# Patient Record
Sex: MALE | Race: WHITE | NOT HISPANIC OR LATINO | Employment: FULL TIME | URBAN - NONMETROPOLITAN AREA
[De-identification: names, ages, dates, MRNs, and addresses within clinical notes are randomized per-mention and may not be internally consistent; named-entity substitution may affect disease eponyms.]

---

## 2021-09-27 ENCOUNTER — LAB (OUTPATIENT)
Dept: LAB | Facility: HOSPITAL | Age: 66
End: 2021-09-27

## 2021-09-27 ENCOUNTER — OFFICE VISIT (OUTPATIENT)
Dept: GASTROENTEROLOGY | Facility: CLINIC | Age: 66
End: 2021-09-27

## 2021-09-27 VITALS
DIASTOLIC BLOOD PRESSURE: 70 MMHG | WEIGHT: 228 LBS | TEMPERATURE: 97.8 F | HEART RATE: 66 BPM | BODY MASS INDEX: 31.92 KG/M2 | HEIGHT: 71 IN | SYSTOLIC BLOOD PRESSURE: 122 MMHG | OXYGEN SATURATION: 96 %

## 2021-09-27 DIAGNOSIS — K76.0 FATTY LIVER: ICD-10-CM

## 2021-09-27 DIAGNOSIS — R74.8 ELEVATED LIVER ENZYMES: ICD-10-CM

## 2021-09-27 DIAGNOSIS — R74.8 ELEVATED LIVER ENZYMES: Primary | ICD-10-CM

## 2021-09-27 LAB
ALBUMIN SERPL-MCNC: 4.4 G/DL (ref 3.5–5.2)
ALBUMIN/GLOB SERPL: 1.9 G/DL
ALP SERPL-CCNC: 36 U/L (ref 39–117)
ALT SERPL W P-5'-P-CCNC: 188 U/L (ref 1–41)
ANION GAP SERPL CALCULATED.3IONS-SCNC: 9.7 MMOL/L (ref 5–15)
AST SERPL-CCNC: 135 U/L (ref 1–40)
BILIRUB SERPL-MCNC: 0.6 MG/DL (ref 0–1.2)
BUN SERPL-MCNC: 17 MG/DL (ref 8–23)
BUN/CREAT SERPL: 16.5 (ref 7–25)
CALCIUM SPEC-SCNC: 9.6 MG/DL (ref 8.6–10.5)
CERULOPLASMIN SERPL-MCNC: 18 MG/DL (ref 16–31)
CHLORIDE SERPL-SCNC: 103 MMOL/L (ref 98–107)
CO2 SERPL-SCNC: 26.3 MMOL/L (ref 22–29)
CREAT SERPL-MCNC: 1.03 MG/DL (ref 0.76–1.27)
FERRITIN SERPL-MCNC: 330 NG/ML (ref 30–400)
GFR SERPL CREATININE-BSD FRML MDRD: 73 ML/MIN/1.73
GLOBULIN UR ELPH-MCNC: 2.3 GM/DL
GLUCOSE SERPL-MCNC: 162 MG/DL (ref 65–99)
HAV IGM SERPL QL IA: NORMAL
IRON 24H UR-MRATE: 121 MCG/DL (ref 59–158)
IRON SATN MFR SERPL: 25 % (ref 20–50)
POTASSIUM SERPL-SCNC: 4.4 MMOL/L (ref 3.5–5.2)
PROT SERPL-MCNC: 6.7 G/DL (ref 6–8.5)
SODIUM SERPL-SCNC: 139 MMOL/L (ref 136–145)
TIBC SERPL-MCNC: 478 MCG/DL (ref 298–536)
TRANSFERRIN SERPL-MCNC: 321 MG/DL (ref 200–360)

## 2021-09-27 PROCEDURE — 84466 ASSAY OF TRANSFERRIN: CPT

## 2021-09-27 PROCEDURE — 86709 HEPATITIS A IGM ANTIBODY: CPT

## 2021-09-27 PROCEDURE — 82104 ALPHA-1-ANTITRYPSIN PHENO: CPT

## 2021-09-27 PROCEDURE — 86038 ANTINUCLEAR ANTIBODIES: CPT

## 2021-09-27 PROCEDURE — 83540 ASSAY OF IRON: CPT

## 2021-09-27 PROCEDURE — 99204 OFFICE O/P NEW MOD 45 MIN: CPT | Performed by: NURSE PRACTITIONER

## 2021-09-27 PROCEDURE — 86376 MICROSOMAL ANTIBODY EACH: CPT

## 2021-09-27 PROCEDURE — 82390 ASSAY OF CERULOPLASMIN: CPT

## 2021-09-27 PROCEDURE — 82103 ALPHA-1-ANTITRYPSIN TOTAL: CPT

## 2021-09-27 PROCEDURE — 36415 COLL VENOUS BLD VENIPUNCTURE: CPT

## 2021-09-27 PROCEDURE — 83516 IMMUNOASSAY NONANTIBODY: CPT

## 2021-09-27 PROCEDURE — 80053 COMPREHEN METABOLIC PANEL: CPT

## 2021-09-27 PROCEDURE — 82728 ASSAY OF FERRITIN: CPT

## 2021-09-27 RX ORDER — MONTELUKAST SODIUM 10 MG/1
10 TABLET ORAL
COMMUNITY
Start: 2021-09-21

## 2021-09-27 RX ORDER — TRIAMCINOLONE ACETONIDE 40 MG/ML
40 INJECTION, SUSPENSION INTRA-ARTICULAR; INTRAMUSCULAR
COMMUNITY

## 2021-09-27 RX ORDER — TADALAFIL 20 MG/1
TABLET ORAL TAKE AS DIRECTED
COMMUNITY

## 2021-09-27 RX ORDER — EZETIMIBE 10 MG/1
10 TABLET ORAL
COMMUNITY
Start: 2021-09-21

## 2021-09-27 RX ORDER — FENOFIBRATE 145 MG/1
145 TABLET, COATED ORAL EVERY EVENING
COMMUNITY
Start: 2021-09-21

## 2021-09-27 RX ORDER — GABAPENTIN 100 MG/1
100 CAPSULE ORAL
COMMUNITY

## 2021-09-27 RX ORDER — ESCITALOPRAM OXALATE 20 MG/1
20 TABLET ORAL DAILY
COMMUNITY

## 2021-09-27 RX ORDER — TAMSULOSIN HYDROCHLORIDE 0.4 MG/1
1 CAPSULE ORAL DAILY
COMMUNITY

## 2021-09-27 RX ORDER — ROPINIROLE 0.5 MG/1
0.5 TABLET, FILM COATED ORAL DAILY
COMMUNITY
Start: 2021-08-19

## 2021-09-27 RX ORDER — LOSARTAN POTASSIUM 50 MG/1
1 TABLET ORAL DAILY
COMMUNITY

## 2021-09-27 RX ORDER — ASPIRIN 81 MG/1
1 TABLET ORAL DAILY
COMMUNITY

## 2021-09-27 NOTE — PROGRESS NOTES
"Chief Complaint:   Chief Complaint   Patient presents with   • Elevated Hepatic Enzymes     Pt had labs for rheumatologist that showed elevated liver enzymes-presents today to discuss         Patient ID: John Pereira is a 66 y.o. male     History of Present Illness: This is a very pleasant 66-year-old male who is here today referred for elevated liver enzymes.    Labs on 9/9/2021 CMP revealed hyperglycemia, creatinine 1.27, GFR 59, ,  previously they had been  .  Hepatitis B Ag negative, hepatitis B core antibody negative, hepatitis C antibody negative smooth muscle antibody 2.6 M2 antibody less than 20.  Liver ultrasound was performed on 8/31/2021 with findings of echogenic liver most commonly due to fatty liver.  Cholecystectomy no bile duct dilation.  The patient states that within the past year to 6 months has been the only time his liver enzymes have been elevated.  After discussion he does tell me that both gabapentin and Requip were initiated in the past year.  He t fatty liver ells me that he has taken Mobic \"for years but this was stopped about 5 months ago due to elevated liver enzymes.\"  The patient denies any history of tattoos, no IV drug use, no blood transfusions.  The patient states that he drinks total of 10 drinks a week \"sometimes beer or sometimes wine.\"    The patient denies any nausea, vomiting, epigastric pain, dysphagia, pyrosis or hematemesis.  The patient denies any fever or chills.  Denies any melena or hematochezia.  Denies any unintentional weight loss or loss of appetite.  The patient denies any jaundice, icterus, edema or ascites.        Past Medical History:   Diagnosis Date   • Arthritis    • History of squamous cell carcinoma    • Hyperlipidemia    • Hypertension    • Restless legs syndrome (RLS)        Past Surgical History:   Procedure Laterality Date   • ACHILLES TENDON REPAIR     • MANDIBLE SURGERY     • SQUAMOUS CELL CARCINOMA EXCISION      " From face         Current Outpatient Medications:   •  aspirin (ASPIR) 81 MG EC tablet, Take 1 tablet by mouth Daily., Disp: , Rfl:   •  escitalopram (LEXAPRO) 20 MG tablet, Take 20 mg by mouth Daily., Disp: , Rfl:   •  ezetimibe (ZETIA) 10 MG tablet, Take 10 mg by mouth every night at bedtime., Disp: , Rfl:   •  fenofibrate (TRICOR) 145 MG tablet, Take 145 mg by mouth Every Evening., Disp: , Rfl:   •  gabapentin (NEURONTIN) 100 MG capsule, Take 100 mg by mouth every night at bedtime., Disp: , Rfl:   •  losartan (COZAAR) 50 MG tablet, Take 1 tablet by mouth Daily., Disp: , Rfl:   •  montelukast (SINGULAIR) 10 MG tablet, Take 10 mg by mouth every night at bedtime., Disp: , Rfl:   •  rOPINIRole (REQUIP) 0.5 MG tablet, Take 0.5 mg by mouth Daily., Disp: , Rfl:   •  tadalafil (CIALIS) 20 MG tablet, Take As Directed., Disp: , Rfl:   •  tamsulosin (FLOMAX) 0.4 MG capsule 24 hr capsule, Take 1 capsule by mouth Daily., Disp: , Rfl:   •  triamcinolone acetonide (Kenalog-40) 40 MG/ML injection, Inject 40 mg into the appropriate muscle as directed by prescriber Every 3 (Three) Months., Disp: , Rfl:     Allergies   Allergen Reactions   • Ciprofloxacin Other (See Comments)     Caused achilles tendonitis       Social History     Socioeconomic History   • Marital status:      Spouse name: Not on file   • Number of children: Not on file   • Years of education: Not on file   • Highest education level: Not on file   Tobacco Use   • Smoking status: Never Smoker   • Smokeless tobacco: Never Used   Vaping Use   • Vaping Use: Never used   Substance and Sexual Activity   • Alcohol use: Yes     Comment: 9/27/21-Daily-depends on the day how much or if he has a drink every day   • Drug use: Defer   • Sexual activity: Defer       Family History   Problem Relation Age of Onset   • Colon polyps Neg Hx    • Colon cancer Neg Hx    • Esophageal cancer Neg Hx    • Liver cancer Neg Hx    • Stomach cancer Neg Hx    • Rectal cancer Neg Hx    •  "Liver disease Neg Hx        Vitals:    09/27/21 0932   BP: 122/70   BP Location: Left arm   Patient Position: Sitting   Cuff Size: Adult   Pulse: 66   Temp: 97.8 °F (36.6 °C)   TempSrc: Infrared   SpO2: 96%   Weight: 103 kg (228 lb)   Height: 179.1 cm (70.5\")       Review of Systems:    General:    Present -feeling well   Skin:    Not Present-Rash   HEENT:     Not Present-Acute visual changes or Acute hearing changes   Neck :    Not Present- swollen glands   Genitourinary:      Not Present- burning, frequency, urgency hematuria, dysuria,   Cardiovascular:   Not Present-chest pain, palpitations, or pressure   Respiratory:   Not Present- shortness of breath or cough   Gastrointestinal:  Musculoskeletal:  Neurological:  Psychiatric:   Present as mentioned in the HP    Not Present. Recent gait disturbances.    Not Present-Seizures and weakness in extremities.    Not Present- Anxiety or Depression.       Physical Exam:    General Appearance:    Alert, cooperative, in no acute distress   Psych:    Mood appropriate    Eyes:          conjunctivae and sclerae normal, no   icterus, no pallor   ENMT:    Ears appear intact with no abnormalities noted oral mucosa moist   Neck:   No adenopathy, supple, trachea midline, no thyromegaly, no   carotid bruit, no JVD    Cardiovascular:    Regular rhythm and normal rate, normal S1 and S2, no            murmur, no gallop, no rub, no click   Gastrointestinal:     Inspection normal.  Normal bowel sounds, no masses, no organomegaly, soft round non-tender, non-distended, no guarding, no rebound or tenderness. No hepatosplenomegaly.   Skin:   No bleeding, bruising or rash   Neurologic:   nonfocal       Assessment and Plan:  Assessment/Plan   Diagnoses and all orders for this visit:    1. Elevated liver enzymes (Primary)  -     KATHARINA; Future  -     Mitochondrial Antibodies, M2; Future  -     Anti-Smooth Muscle Antibody Titer; Future  -     Anti-microsomal Antibody; Future  -     Iron Profile; " Future  -     Ferritin; Future  -     Ceruloplasmin; Future  -     Alpha - 1 - Antitrypsin; Future  -     Alpha - 1 - Antitrypsin Phenotype; Future  -     Hepatitis A Antibody, IgM; Future  -     Comprehensive Metabolic Panel; Future    2. Fatty liver  -     KATHARINA; Future  -     Mitochondrial Antibodies, M2; Future  -     Anti-Smooth Muscle Antibody Titer; Future  -     Anti-microsomal Antibody; Future  -     Iron Profile; Future  -     Ferritin; Future  -     Ceruloplasmin; Future  -     Alpha - 1 - Antitrypsin; Future  -     Alpha - 1 - Antitrypsin Phenotype; Future  -     Hepatitis A Antibody, IgM; Future  -     Comprehensive Metabolic Panel; Future    Other orders  -     Cancel: US Liver; Future      Labs as noted above when all are resulted will notify patient on how we need to further proceed.  We will need to discuss colonoscopy with patient as I do not see he has had one in the past.     There are no Patient Instructions on file for this visit.    Next follow-up appointment        EMR Dragon/Transcription disclaimer:  Much of this encounter note is an electronic transcription/translation of spoken language to printed text. The electronic translation of spoken language may permit erroneous, or at times, nonsensical words or phrases to be inadvertently transcribed; although I have reviewed the note for such errors, some may still exist.

## 2021-09-28 LAB
ACTIN IGG SERPL-ACNC: 18 UNITS (ref 0–19)
ANA SER QL: NEGATIVE
LKM-1 AB SER-ACNC: 1.1 UNITS (ref 0–20)
MITOCHONDRIA M2 IGG SER-ACNC: <20 UNITS (ref 0–20)

## 2021-10-01 LAB
A1AT PHENOTYP SERPL IFE: NORMAL
A1AT SERPL-MCNC: 122 MG/DL (ref 101–187)

## 2021-10-20 ENCOUNTER — TELEPHONE (OUTPATIENT)
Dept: GASTROENTEROLOGY | Facility: CLINIC | Age: 66
End: 2021-10-20

## 2021-10-20 DIAGNOSIS — R74.8 ELEVATED LIVER ENZYMES: Primary | ICD-10-CM

## 2021-10-20 NOTE — TELEPHONE ENCOUNTER
----- Message from DUSTIN Sheehan sent at 10/20/2021  1:58 PM CDT -----  Please notify patient labs were essentially unremarkable with the exception of elevated liver enzymes.  His liver ultrasound on office visit was consistent with fatty liver.  As I discussed with him it is very important that he work on weight loss and absolutely stop alcohol as his liver enzymes continue to be very elevated.  I have placed orders for repeat liver enzymes on 4/1/2022.  Please make him a 6-month follow-up right after the labs to see Dr. Brito.

## 2021-10-21 NOTE — TELEPHONE ENCOUNTER
Pt returned my call and I spoke to him re: results-he VU. He will make himself a note to have labs in April and was advised to call me back with any further questions/problems.     Dayana-he needs an OV with Dr. Brito in April for fatty liver f/u. I told him you would call him tomorrow to schedule that. He is a  so the best time to reach him is before 2:30pm. Thanks!

## 2021-10-21 NOTE — TELEPHONE ENCOUNTER
Pt returned my call late yesterday afternoon and left me a VM. I tried to call him back today-was unable to reach him so I left another VM asking him to call me back to discuss.

## 2021-10-22 NOTE — TELEPHONE ENCOUNTER
Pt is scheduled for OV with Dr. Brito 5/25/21-due to pt's work scheduled(he is a ) he had to wait until school is out. He would prefer to have his labs done in [place] so I am going to mail him lab orders to have done 1 week prior to his OV. Pt advised to call me back with any further questions/problems.

## 2022-05-15 PROBLEM — R79.89 ABNORMAL LIVER FUNCTION TESTS: Status: ACTIVE | Noted: 2022-05-15

## 2023-10-20 ENCOUNTER — OFFICE VISIT (OUTPATIENT)
Dept: CARDIOLOGY | Facility: CLINIC | Age: 68
End: 2023-10-20
Payer: MEDICARE

## 2023-10-20 DIAGNOSIS — R06.09 DOE (DYSPNEA ON EXERTION): ICD-10-CM

## 2023-10-20 DIAGNOSIS — E78.2 MIXED HYPERLIPIDEMIA: ICD-10-CM

## 2023-10-20 DIAGNOSIS — R29.818 SUSPECTED SLEEP APNEA: ICD-10-CM

## 2023-10-20 DIAGNOSIS — N52.8 OTHER MALE ERECTILE DYSFUNCTION: ICD-10-CM

## 2023-10-20 DIAGNOSIS — I10 PRIMARY HYPERTENSION: ICD-10-CM

## 2023-10-20 DIAGNOSIS — I35.1 NONRHEUMATIC AORTIC VALVE INSUFFICIENCY: Primary | ICD-10-CM

## 2023-10-20 DIAGNOSIS — Z01.810 PREOP CARDIOVASCULAR EXAM: ICD-10-CM

## 2023-10-20 DIAGNOSIS — G89.29 CHRONIC PAIN OF RIGHT KNEE: ICD-10-CM

## 2023-10-20 DIAGNOSIS — M25.561 CHRONIC PAIN OF RIGHT KNEE: ICD-10-CM

## 2023-10-20 DIAGNOSIS — R79.89 ABNORMAL LIVER FUNCTION TESTS: ICD-10-CM

## 2023-10-20 DIAGNOSIS — R35.1 BPH ASSOCIATED WITH NOCTURIA: ICD-10-CM

## 2023-10-20 DIAGNOSIS — N40.1 BPH ASSOCIATED WITH NOCTURIA: ICD-10-CM

## 2023-10-20 DIAGNOSIS — R00.2 PALPITATIONS: ICD-10-CM

## 2023-10-20 NOTE — PROGRESS NOTES
John Pereira  [id number]  1955  68 y.o.  male    Referring Provider: Vishal Roach MD    Reason for  Visit:  Initial visit        Subjective    Moderate  chronic exertional shortness of breath on exertion relieved with rest  No significant cough or wheezing    Intermittent palpitations, once every several days to several weeks lasting for less than 1 minute  No associated symptoms of dizziness, weakness, chest pain,  shortness of breath    Usually with exertion     No associated chest pain  No significant pedal edema    No fever or chills  No significant expectoration    No hemoptysis  No presyncope or syncope    Tolerating current medications well with no untoward side effects   Compliant with prescribed medication regimen. Tries to adhere to cardiac diet.     Easy fatiguability and increasing tired  Feels energy levels running low      Preoperative cardiovascular clearance under general anesthesia for right total knee replacement         History of present illness:  John Pereira is a 68 y.o. yo male with essential hypertension  who presents today for No chief complaint on file.  .    History  Past Medical History:   Diagnosis Date    Arthritis     History of squamous cell carcinoma     Hyperlipidemia     Hypertension     Restless legs syndrome (RLS)    ,   Past Surgical History:   Procedure Laterality Date    ACHILLES TENDON REPAIR      MANDIBLE SURGERY      SQUAMOUS CELL CARCINOMA EXCISION      From face   ,   Family History   Problem Relation Age of Onset    Colon polyps Neg Hx     Colon cancer Neg Hx     Esophageal cancer Neg Hx     Liver cancer Neg Hx     Stomach cancer Neg Hx     Rectal cancer Neg Hx     Liver disease Neg Hx    ,   Social History     Tobacco Use    Smoking status: Never    Smokeless tobacco: Never   Vaping Use    Vaping Use: Never used   Substance Use Topics    Alcohol use: Yes     Comment: 9/27/21-Daily-depends on the day how much or if he has a drink every day    Drug  use: Defer   ,     Medications  Current Outpatient Medications   Medication Sig Dispense Refill    aspirin (ASPIR) 81 MG EC tablet Take 1 tablet by mouth Daily.      escitalopram (LEXAPRO) 20 MG tablet Take 20 mg by mouth Daily.      ezetimibe (ZETIA) 10 MG tablet Take 10 mg by mouth every night at bedtime.      fenofibrate (TRICOR) 145 MG tablet Take 145 mg by mouth Every Evening.      gabapentin (NEURONTIN) 100 MG capsule Take 100 mg by mouth every night at bedtime.      losartan (COZAAR) 50 MG tablet Take 1 tablet by mouth Daily.      montelukast (SINGULAIR) 10 MG tablet Take 10 mg by mouth every night at bedtime.      rOPINIRole (REQUIP) 0.5 MG tablet Take 0.5 mg by mouth Daily.      tadalafil (CIALIS) 20 MG tablet Take As Directed.      tamsulosin (FLOMAX) 0.4 MG capsule 24 hr capsule Take 1 capsule by mouth Daily.      triamcinolone acetonide (Kenalog-40) 40 MG/ML injection Inject 40 mg into the appropriate muscle as directed by prescriber Every 3 (Three) Months.       No current facility-administered medications for this visit.       Allergies:  Ciprofloxacin    Review of Systems  Review of Systems   Constitutional: Positive for malaise/fatigue.   HENT: Negative.     Eyes: Negative.    Cardiovascular:  Positive for dyspnea on exertion and palpitations. Negative for chest pain, claudication, cyanosis, irregular heartbeat, leg swelling, near-syncope, orthopnea, paroxysmal nocturnal dyspnea and syncope.   Respiratory: Negative.     Endocrine: Negative.    Hematologic/Lymphatic: Negative.    Skin: Negative.    Musculoskeletal:  Positive for arthritis.   Gastrointestinal:  Negative for anorexia.   Genitourinary: Negative.    Neurological: Negative.    Psychiatric/Behavioral: Negative.         Objective     Physical Exam:  There were no vitals taken for this visit.    Physical Exam  Constitutional:       Appearance: He is well-developed.   HENT:      Head: Normocephalic.   Neck:      Vascular: Normal carotid  pulses. No carotid bruit or JVD.      Trachea: No tracheal tenderness or tracheal deviation.   Cardiovascular:      Rate and Rhythm: Regular rhythm.      Pulses: Normal pulses.      Heart sounds: Normal heart sounds.       with a grade of 2/6.       with a grade of 1/4.   Pulmonary:      Effort: Pulmonary effort is normal.      Breath sounds: No stridor.   Abdominal:      Palpations: Abdomen is soft.   Musculoskeletal:      Cervical back: No edema.   Skin:     General: Skin is warm.   Neurological:      Mental Status: He is alert.      Cranial Nerves: No cranial nerve deficit.      Sensory: No sensory deficit.   Psychiatric:         Speech: Speech normal.         Behavior: Behavior normal.         Results Review:     ____________________________________________________________________________________________________________________________________________  Health maintenance and recommendations    Low salt/ HTN/ Heart healthy carbohydrate restricted cardiac diet   The patient is advised to reduce or avoid caffeine or other cardiac stimulants.   Minimize or avoid  NSAID-type medications      Monitor for any signs of bleeding including red or dark stools. Fall precautions.   Advised staying uptodate with immunizations per established standard guidelines.    Offered to give patient  a copy of my notes     Questions were encouraged, asked and answered to the patient's  understanding and satisfaction. Questions if any regarding current medications and side effects, need for refills and importance of compliance to medications stressed.    Reviewed available prior notes, consults, prior visits, laboratory findings, radiology and cardiology relevant reports. Updated chart as applicable. I have reviewed the patient's medical history in detail and updated the computerized patient record as relevant.      Updated patient regarding any new or relevant abnormalities on review of records or any new findings on physical exam.  Mentioned to patient about purpose of visit and desirable health short and long term goals and objectives.    Primary to monitor CBC CMP Lipid panel and TSH as applicable    ___________________________________________________________________________________________________________________________________________     ECG 12 Lead    Date/Time: 10/20/2023 8:15 AM  Performed by: Jesus Aguero MD    Authorized by: Jesus Aguero MD  Comparison: not compared with previous ECG   Rhythm: sinus rhythm  Rate: normal  Conduction: conduction normal  ST Segments: ST segments normal  QRS axis: normal  Other findings: non-specific ST-T wave changes    Clinical impression: abnormal EKG          Assessment & Plan   Diagnoses and all orders for this visit:    1. Nonrheumatic aortic valve insufficiency mild (Primary)    2. Primary hypertension    3. Other male erectile dysfunction    4. BPH associated with nocturia    5. Abnormal liver function tests    6. Mixed hyperlipidemia    7. Chronic pain of right knee    8. Preop cardiovascular exam right TKR Theresa Acosta  -     Adult Stress Echo W/ Cont or Stress Agent if Necessary Per Protocol; Future    9. Palpitations  -     Holter Monitor - 72 Hour Up To 15 Days; Future  -     Adult Stress Echo W/ Cont or Stress Agent if Necessary Per Protocol; Future    10. Suspected sleep apnea  -     Ambulatory Referral to Sleep Medicine    11. LACY (dyspnea on exertion)  -     Adult Stress Echo W/ Cont or Stress Agent if Necessary Per Protocol; Future    Other orders  -     ECG 12 Lead          Plan  Patient expressed understanding  Encouraged and answered all questions   Discussed with the patient and all questioned fully answered. He will call me if any problems arise.   Discussed results of prior testing with patient : echo   as well electrocardiogram from today      Orders Placed This Encounter   Procedures    Ambulatory Referral to Sleep Medicine     Referral Priority:   Routine      Referral Type:   Consultation     Number of Visits Requested:   1    Holter Monitor - 72 Hour Up To 15 Days     Standing Status:   Future     Standing Expiration Date:   10/20/2024     Order Specific Question:   Reason for exam?     Answer:   Palpitations     Order Specific Question:   How many days is the patient to wear the monitor?     Answer:   14     Order Specific Question:   Release to patient     Answer:   Routine Release [1400000002]    ECG 12 Lead     This order was created via procedure documentation     Order Specific Question:   Release to patient     Answer:   Routine Release [1400000002]    Adult Stress Echo W/ Cont or Stress Agent if Necessary Per Protocol     Standing Status:   Future     Standing Expiration Date:   10/19/2024     Order Specific Question:   What stress agent will be used?     Answer:   Dobutamine     Order Specific Question:   Difficulty walking criteria?     Answer:   Musculoskeletal (hips, knees, feet, back, amputee)     Order Specific Question:   Reason for exam?     Answer:   Dyspnea     Order Specific Question:   Release to patient     Answer:   Routine Release [1400000002]      Check BP and heart rates twice daily initially till blood pressures and heart rates under good control and then at least 3x / week,   If blood pressures continue to be well-controlled then can check week a month  at home and bring a recording for review next visit  If BP >130/85 or < 100/60 persistently over 3 reading 30 mins apart or if heart rates persistently above 100 bpm or less than 55 bpm call sooner for evaluation and advise     May need coronary CT angiography in future if chest pain persists and other tests are unrevealing for cause of chest pain              Return in about 6 weeks (around 12/1/2023).

## 2023-10-23 ENCOUNTER — TELEPHONE (OUTPATIENT)
Dept: CARDIOLOGY | Facility: CLINIC | Age: 68
End: 2023-10-23
Payer: MEDICARE

## 2023-10-23 NOTE — TELEPHONE ENCOUNTER
[facility] received a fax that requires your attention. The document has been indexed to the patient’s chart for your review.      Reason for sending: EXTERNAL MEDICAL RECORD NOTIFICATION     Documents Description: PHYS ORD-[facility] FOR ORTHO [facility] SP[facility]TS MED CARDIAC CLEARANCE REQ-10.23.23    Name of Sender: [facility]     Date Indexed: 10.23.23

## 2023-11-06 ENCOUNTER — HOSPITAL ENCOUNTER (OUTPATIENT)
Dept: CARDIOLOGY | Facility: HOSPITAL | Age: 68
Discharge: HOME | End: 2023-11-06
Admitting: INTERNAL MEDICINE
Payer: MEDICARE

## 2023-11-06 VITALS
BODY MASS INDEX: 29.8 KG/M2 | HEIGHT: 72 IN | HEART RATE: 90 BPM | DIASTOLIC BLOOD PRESSURE: 94 MMHG | SYSTOLIC BLOOD PRESSURE: 145 MMHG | WEIGHT: 220 LBS

## 2023-11-06 DIAGNOSIS — R06.09 DOE (DYSPNEA ON EXERTION): ICD-10-CM

## 2023-11-06 DIAGNOSIS — R00.2 PALPITATIONS: ICD-10-CM

## 2023-11-06 DIAGNOSIS — Z01.810 PREOP CARDIOVASCULAR EXAM: ICD-10-CM

## 2023-11-06 PROCEDURE — 93017 CV STRESS TEST TRACING ONLY: CPT

## 2023-11-06 PROCEDURE — 25510000001 PERFLUTREN 6.52 MG/ML SUSPENSION: Performed by: INTERNAL MEDICINE

## 2023-11-06 PROCEDURE — 93350 STRESS TTE ONLY: CPT

## 2023-11-06 RX ORDER — METOPROLOL TARTRATE 5 MG/5ML
5 INJECTION INTRAVENOUS ONCE
Status: DISCONTINUED | OUTPATIENT
Start: 2023-11-06 | End: 2023-11-07 | Stop reason: HOSPADM

## 2023-11-06 RX ORDER — DOBUTAMINE HYDROCHLORIDE 100 MG/100ML
10-50 INJECTION INTRAVENOUS CONTINUOUS
Status: DISCONTINUED | OUTPATIENT
Start: 2023-11-06 | End: 2023-11-07 | Stop reason: HOSPADM

## 2023-11-06 RX ADMIN — PERFLUTREN 8.48 MG: 6.52 INJECTION, SUSPENSION INTRAVENOUS at 09:12

## 2023-11-06 RX ADMIN — DOBUTAMINE HYDROCHLORIDE 10 MCG/KG/MIN: 100 INJECTION INTRAVENOUS at 09:12

## 2023-11-11 LAB
BH CV STRESS BP STAGE 1: NORMAL
BH CV STRESS BP STAGE 2: NORMAL
BH CV STRESS BP STAGE 3: NORMAL
BH CV STRESS DOSE DOBUTAMINE STAGE 1: 10
BH CV STRESS DOSE DOBUTAMINE STAGE 2: 20
BH CV STRESS DOSE DOBUTAMINE STAGE 3: 30
BH CV STRESS DURATION MIN STAGE 1: 3
BH CV STRESS DURATION MIN STAGE 2: 3
BH CV STRESS DURATION MIN STAGE 3: 2
BH CV STRESS DURATION SEC STAGE 1: 0
BH CV STRESS DURATION SEC STAGE 2: 0
BH CV STRESS DURATION SEC STAGE 3: 47
BH CV STRESS ECHO POST STRESS EJECTION FRACTION EF: 65 %
BH CV STRESS HR STAGE 1: 93
BH CV STRESS HR STAGE 2: 121
BH CV STRESS HR STAGE 3: 139
BH CV STRESS PROTOCOL 1: NORMAL
BH CV STRESS RECOVERY BP: NORMAL MMHG
BH CV STRESS RECOVERY HR: 100 BPM
BH CV STRESS STAGE 1: 1
BH CV STRESS STAGE 2: 2
BH CV STRESS STAGE 3: 3
MAXIMAL PREDICTED HEART RATE: 154 BPM
PERCENT MAX PREDICTED HR: 90.26 %
STRESS BASELINE BP: NORMAL MMHG
STRESS BASELINE HR: 90 BPM
STRESS PERCENT HR: 106 %
STRESS POST EXERCISE DUR MIN: 8 MIN
STRESS POST EXERCISE DUR SEC: 47 SEC
STRESS POST PEAK BP: NORMAL MMHG
STRESS POST PEAK HR: 139 BPM
STRESS TARGET HR: 131 BPM

## 2023-12-06 ENCOUNTER — OFFICE VISIT (OUTPATIENT)
Dept: CARDIOLOGY | Facility: CLINIC | Age: 68
End: 2023-12-06
Payer: MEDICARE

## 2023-12-06 VITALS
HEART RATE: 78 BPM | SYSTOLIC BLOOD PRESSURE: 128 MMHG | DIASTOLIC BLOOD PRESSURE: 78 MMHG | HEIGHT: 72 IN | BODY MASS INDEX: 29.53 KG/M2 | RESPIRATION RATE: 18 BRPM | OXYGEN SATURATION: 98 % | WEIGHT: 218 LBS

## 2023-12-06 DIAGNOSIS — I10 PRIMARY HYPERTENSION: ICD-10-CM

## 2023-12-06 DIAGNOSIS — E78.2 MIXED HYPERLIPIDEMIA: ICD-10-CM

## 2023-12-06 DIAGNOSIS — I71.21 ANEURYSM OF ASCENDING AORTA WITHOUT RUPTURE: ICD-10-CM

## 2023-12-06 DIAGNOSIS — I47.10 PAROXYSMAL SVT (SUPRAVENTRICULAR TACHYCARDIA): Primary | ICD-10-CM

## 2023-12-06 PROCEDURE — 1159F MED LIST DOCD IN RCRD: CPT | Performed by: NURSE PRACTITIONER

## 2023-12-06 PROCEDURE — 99214 OFFICE O/P EST MOD 30 MIN: CPT | Performed by: NURSE PRACTITIONER

## 2023-12-06 PROCEDURE — 3074F SYST BP LT 130 MM HG: CPT | Performed by: NURSE PRACTITIONER

## 2023-12-06 PROCEDURE — 3078F DIAST BP <80 MM HG: CPT | Performed by: NURSE PRACTITIONER

## 2023-12-06 PROCEDURE — 1160F RVW MEDS BY RX/DR IN RCRD: CPT | Performed by: NURSE PRACTITIONER

## 2023-12-06 NOTE — PROGRESS NOTES
Subjective:     Encounter Date:12/06/2023      Patient ID: John Pereira is a 68 y.o. male     Chief Complaint:  History of Present Illness  Patient presents today for follow up from recent testing. Patient was referred to Dr. Aguero in October for pre-op clearance, palpitations, and dyspnea on exertion. Patient had a holter monitor, stress echo and referral to sleep medicine placed at that time. He follows up today for results and instructions for possible surgery. He had an echo done at [facility] in August. Patient's holter monitor showed 7 runs of NSVT. Stress test was low risk for ischemia. Patient has hypertension, hyperlipidemia. Overall patient is stable. He has occasional palpitations but these are rare. He does note he had a hematoma on his kidneys recently and has been taking it easy. His echo done at [facility] - showed mild AI and 4.5 ascending aorta dilation. He notes he was not aware of his aorta.     The following portions of the patient's history were reviewed and updated as appropriate: allergies, current medications, past medical history, past social history, past and problem list.    Allergies   Allergen Reactions    Ciprofloxacin Other (See Comments)     Caused achilles tendonitis       Current Outpatient Medications:     escitalopram (LEXAPRO) 20 MG tablet, Take 1 tablet by mouth Daily., Disp: , Rfl:     ezetimibe (ZETIA) 10 MG tablet, Take 1 tablet by mouth every night at bedtime., Disp: , Rfl:     fenofibrate (TRICOR) 145 MG tablet, Take 1 tablet by mouth Every Evening., Disp: , Rfl:     gabapentin (NEURONTIN) 100 MG capsule, Take 1 capsule by mouth every night at bedtime., Disp: , Rfl:     losartan (COZAAR) 50 MG tablet, Take 1 tablet by mouth Daily., Disp: , Rfl:     montelukast (SINGULAIR) 10 MG tablet, Take 1 tablet by mouth every night at bedtime., Disp: , Rfl:     rOPINIRole (REQUIP) 0.5 MG tablet, Take 1 tablet by mouth Daily., Disp: , Rfl:      tadalafil (CIALIS) 20 MG tablet, Take As Directed., Disp: , Rfl:     tamsulosin (FLOMAX) 0.4 MG capsule 24 hr capsule, Take 1 capsule by mouth Daily., Disp: , Rfl:     triamcinolone acetonide (Kenalog-40) 40 MG/ML injection, Inject 1 mL into the appropriate muscle as directed by prescriber Every 3 (Three) Months., Disp: , Rfl:     metoprolol tartrate (LOPRESSOR) 25 MG tablet, Take 1 tablet by mouth 2 (Two) Times a Day., Disp: 60 tablet, Rfl: 11    Social History     Socioeconomic History    Marital status:    Tobacco Use    Smoking status: Never    Smokeless tobacco: Never   Vaping Use    Vaping Use: Never used   Substance and Sexual Activity    Alcohol use: Yes     Comment: 9/27/21-Daily-depends on the day how much or if he has a drink every day    Drug use: Defer    Sexual activity: Defer       Review of Systems   Constitutional: Negative for chills, decreased appetite, fever, malaise/fatigue, weight gain and weight loss.   HENT:  Negative for nosebleeds.    Eyes:  Negative for visual disturbance.   Cardiovascular:  Positive for palpitations. Negative for chest pain, dyspnea on exertion, leg swelling, near-syncope, orthopnea, paroxysmal nocturnal dyspnea and syncope.   Respiratory:  Negative for cough, hemoptysis, shortness of breath and snoring.    Endocrine: Negative for cold intolerance and heat intolerance.   Hematologic/Lymphatic: Negative for bleeding problem. Does not bruise/bleed easily.   Skin:  Negative for rash.   Musculoskeletal:  Positive for joint pain. Negative for back pain and falls.   Gastrointestinal:  Negative for abdominal pain, constipation, diarrhea, heartburn, melena, nausea and vomiting.   Genitourinary:  Negative for hematuria.   Neurological:  Negative for dizziness, headaches and light-headedness.   Psychiatric/Behavioral:  Negative for altered mental status.    Allergic/Immunologic: Negative for persistent infections.              Objective:     Constitutional:        "Appearance: Healthy appearance. Well-developed and not in distress.   Eyes:      Pupils: Pupils are equal, round, and reactive to light.   HENT:      Head: Normocephalic and atraumatic.   Neck:      Vascular: No carotid bruit or JVD.   Pulmonary:      Effort: Pulmonary effort is normal.      Breath sounds: Normal breath sounds.   Cardiovascular:      Normal rate. Regular rhythm.      Murmurs: There is no murmur.   Pulses:     Intact distal pulses.   Edema:     Peripheral edema absent.   Abdominal:      General: Bowel sounds are normal.      Palpations: Abdomen is soft.   Musculoskeletal: Normal range of motion.      Cervical back: Normal range of motion and neck supple. Skin:     General: Skin is warm and dry.   Neurological:      Mental Status: Alert and oriented to person, place, and time.      Deep Tendon Reflexes: Reflexes are normal and symmetric.   Psychiatric:         Behavior: Behavior normal.         Thought Content: Thought content normal.         Judgment: Judgment normal.           Procedures  /78 (BP Location: Left arm, Patient Position: Sitting, Cuff Size: Adult)   Pulse 78   Resp 18   Ht 182.9 cm (72\")   Wt 98.9 kg (218 lb)   SpO2 98%   BMI 29.57 kg/m²   Lab Review:   I have reviewed               No results found for: \"CHOL\", \"CHLPL\", \"TRIG\", \"HDL\", \"LDL\", \"LDLDIRECT\"   Results for orders placed during the hospital encounter of 11/06/23    Adult Stress Echo W/ Cont or Stress Agent if Necessary Per Protocol    Interpretation Summary    Left ventricular ejection fraction appears to be 56 - 60%.    The patient denied chest pain.    Low risk stress test for stress induced myocardial ischemia        All echocardiographic phases visualized which shows increase in contractility wall motion and thickness both globally and regionally suggestive of no obvious evidence of stress-induced ischemia    ____________________________________________________________________  Disclaimer:    Despite low risk " stress test for stress induced myocardial ischemia, there is a small but definite fraction of patients who will have significant underlying coronary artery disease that needs further evaluation and definitive treatment.    Missing significant coronary artery disease may result in increased morbidity and mortality.  In view of this if any symptoms such as chest pain, shortness of breath, increasing weakness, feeling dizzy, passing out, palpitations, cold sweats etc.,to seek immediate medical help.    Stress test is intrinsically limited and therefore the results do not confirm or rule out presence of coronary artery disease and need to be interpreted on the basis of presentation and overall clinical picture.  ______________________________________________________________________     Assessment:          Diagnosis Plan   1. Paroxysmal SVT (supraventricular tachycardia)        2. Primary hypertension        3. Mixed hyperlipidemia        4. Aneurysm of ascending aorta without rupture  CT Angiogram Chest             Plan:       Paroxysmal SVT- brief. Occasional palpitations. Will start Lopressor.   Hypertension- Blood pressure stable. Discussed importance of control.  Hyperlipidemia- managed by PCP  Ascending aorta aneurysm - 4.5 cm on echo in August at [facility]. Order CTA. Discussed likely referral to CTS pending results. Discussed no strenuous lifting.

## 2023-12-28 ENCOUNTER — HOSPITAL ENCOUNTER (OUTPATIENT)
Dept: CT IMAGING | Facility: HOSPITAL | Age: 68
Discharge: HOME | End: 2023-12-28
Admitting: NURSE PRACTITIONER
Payer: MEDICARE

## 2023-12-28 DIAGNOSIS — R93.89 ABNORMAL COMPUTED TOMOGRAPHY ANGIOGRAPHY (CTA): ICD-10-CM

## 2023-12-28 DIAGNOSIS — I77.810 AORTIC ROOT DILATION: Primary | ICD-10-CM

## 2023-12-28 PROCEDURE — 71275 CT ANGIOGRAPHY CHEST: CPT

## 2023-12-28 PROCEDURE — 25510000001 IOPAMIDOL PER 1 ML: Performed by: NURSE PRACTITIONER

## 2023-12-28 RX ADMIN — IOPAMIDOL 100 ML: 755 INJECTION, SOLUTION INTRAVENOUS at 09:29

## 2023-12-29 ENCOUNTER — TELEPHONE (OUTPATIENT)
Dept: CARDIOLOGY | Facility: CLINIC | Age: 68
End: 2023-12-29
Payer: MEDICARE

## 2023-12-29 NOTE — TELEPHONE ENCOUNTER
Pt was wanting to know if it was still okay for him to proceed with knee replacement surgery on 01/04/2023.

## 2024-01-17 ENCOUNTER — OFFICE VISIT (OUTPATIENT)
Dept: CARDIAC SURGERY | Facility: CLINIC | Age: 69
End: 2024-01-17
Payer: MEDICARE

## 2024-01-17 VITALS
DIASTOLIC BLOOD PRESSURE: 74 MMHG | OXYGEN SATURATION: 95 % | HEIGHT: 72 IN | BODY MASS INDEX: 28.17 KG/M2 | SYSTOLIC BLOOD PRESSURE: 148 MMHG | HEART RATE: 88 BPM | WEIGHT: 208 LBS

## 2024-01-17 DIAGNOSIS — I77.810 AORTIC ROOT DILATION: Primary | ICD-10-CM

## 2024-01-17 DIAGNOSIS — I71.21 ANEURYSM OF ASCENDING AORTA WITHOUT RUPTURE: Primary | ICD-10-CM

## 2024-01-17 DIAGNOSIS — I71.21 AORTIC ROOT ANEURYSM: ICD-10-CM

## 2024-01-17 RX ORDER — ZOLPIDEM TARTRATE 10 MG/1
10 TABLET ORAL NIGHTLY PRN
COMMUNITY

## 2024-01-17 RX ORDER — OXYCODONE AND ACETAMINOPHEN 7.5; 325 MG/1; MG/1
1 TABLET ORAL EVERY 8 HOURS PRN
COMMUNITY
Start: 2024-01-05

## 2024-01-17 RX ORDER — ASPIRIN 81 MG/1
81 TABLET ORAL DAILY
COMMUNITY
Start: 2024-01-05 | End: 2025-01-05

## 2024-01-17 RX ORDER — DULOXETIN HYDROCHLORIDE 30 MG/1
1 CAPSULE, DELAYED RELEASE ORAL EVERY 24 HOURS
COMMUNITY

## 2024-01-17 RX ORDER — HYDROCODONE BITARTRATE AND ACETAMINOPHEN 7.5; 325 MG/1; MG/1
1 TABLET ORAL EVERY 6 HOURS PRN
COMMUNITY

## 2024-01-17 NOTE — PROGRESS NOTES
Chief Complaint   Patient presents with    Aortic Root Dilation     New patient referred from DUSTIN Feliz         Subjective     History of Present Illness  John Pereira is a 68-year-old male who presents today for an aneurysm consult. He is accompanied by his stepson.    - Underwent total knee arthroplasty 2 weeks ago.  - Does not lift heavier than a gallon of milk.  - He has been on blood pressure medication for 20 years.  - His blood pressure has been well controlled on medication until 07/2023.  - RADHA hernandez has issues regulating his blood pressure.  - He has check ups and blood work every 90 days.  - He was hospitalized on 11/01/2023 due to a cyst rupture in his kidney.  - Was positive for hematoma and mass.  - He was in extreme pain and unable to function at that time.  - He has an appointment with a kidney specialist 01/24/2024.  - His CT scan showed a blood mass.  - He was monitored for days to make sure it was not expanding.  - The blood mass did not expand.  - A 2022 scan showed a cyst.  - He has always been able to lift anything he wanted to lift.  - He goes to the gym and does high reps with low weights.  - He has never smoked.  - His son inquires if there are any dietary changes the patient should make.  - He has not noticed any swelling in his legs.  - He is on pain medication for his knee has caused constipation.  - He is using a cane for ambulation.  - He reports 2 more weeks of post knee arthroplasty precautions.  - He works in construction.        Review of Systems   Negative except as noted in HPI    Past Medical History:   Diagnosis Date    Arthritis     History of squamous cell carcinoma     Hyperlipidemia     Hypertension     Restless legs syndrome (RLS)      Past Surgical History:   Procedure Laterality Date    ACHILLES TENDON REPAIR      MANDIBLE SURGERY      SQUAMOUS CELL CARCINOMA EXCISION      From face     Family History   Problem Relation Age of Onset    Colon polyps Neg Hx      Colon cancer Neg Hx     Esophageal cancer Neg Hx     Liver cancer Neg Hx     Stomach cancer Neg Hx     Rectal cancer Neg Hx     Liver disease Neg Hx      Social History     Tobacco Use    Smoking status: Never     Passive exposure: Never    Smokeless tobacco: Never   Vaping Use    Vaping Use: Never used   Substance Use Topics    Alcohol use: Yes     Comment: Occ    Drug use: Defer     Current Outpatient Medications   Medication Sig Dispense Refill    aspirin 81 MG EC tablet Take 1 tablet by mouth Daily.      Budesonide-Formoterol Fumarate (SYMBICORT IN) 2 puffs As Needed.      DULoxetine (CYMBALTA) 30 MG capsule 1 capsule Daily.      DULoxetine HCl (CYMBALTA PO) Take 30 mg by mouth Daily.      ezetimibe (ZETIA) 10 MG tablet Take 1 tablet by mouth every night at bedtime.      fenofibrate (TRICOR) 145 MG tablet Take 1 tablet by mouth Every Evening.      gabapentin (NEURONTIN) 100 MG capsule Take 1 capsule by mouth every night at bedtime.      HYDROcodone-acetaminophen (NORCO) 7.5-325 MG per tablet Take 1 tablet by mouth Every 6 (Six) Hours As Needed for Moderate Pain.      losartan (COZAAR) 50 MG tablet Take 1 tablet by mouth Daily.      metFORMIN (GLUCOPHAGE) 500 MG tablet Take 1 tablet by mouth.      metoprolol tartrate (LOPRESSOR) 25 MG tablet Take 1 tablet by mouth 2 (Two) Times a Day. 60 tablet 11    montelukast (SINGULAIR) 10 MG tablet Take 1 tablet by mouth every night at bedtime.      oxyCODONE-acetaminophen (PERCOCET) 7.5-325 MG per tablet Take 1 tablet by mouth Every 8 (Eight) Hours As Needed for Moderate Pain (Total Knee Replacement January 2024).      rOPINIRole (REQUIP) 0.5 MG tablet Take 1 tablet by mouth Daily.      tamsulosin (FLOMAX) 0.4 MG capsule 24 hr capsule Take 1 capsule by mouth Daily.      zolpidem (AMBIEN) 10 MG tablet Take 1 tablet by mouth At Night As Needed for Sleep.      escitalopram (LEXAPRO) 20 MG tablet Take 1 tablet by mouth Daily. (Patient not taking: Reported on 1/17/2024)       "tadalafil (CIALIS) 20 MG tablet Take As Directed. (Patient not taking: Reported on 1/17/2024)      triamcinolone acetonide (Kenalog-40) 40 MG/ML injection Inject 1 mL into the appropriate muscle as directed by prescriber Every 3 (Three) Months. (Patient not taking: Reported on 1/17/2024)       No current facility-administered medications for this visit.     Allergies:  Ciprofloxacin    Objective      Vital Signs  Visit Vitals  /74 (BP Location: Left arm, Patient Position: Sitting, Cuff Size: Adult)   Pulse 88   Ht 182.9 cm (72\")   Wt 94.3 kg (208 lb)   SpO2 95%   BMI 28.21 kg/m²         Physical Exam  Constitutional:       Appearance: He is well-developed.      Comments: Noting he is post knee surgery with using a cane for ambulation. This is only for another 2 more weeks.   HENT:      Head: Normocephalic and atraumatic.   Eyes:      Pupils: Pupils are equal, round, and reactive to light.   Neck:      Thyroid: No thyromegaly.      Vascular: No JVD.      Trachea: No tracheal deviation.   Cardiovascular:      Rate and Rhythm: Normal rate and regular rhythm.      Heart sounds: Normal heart sounds. No murmur heard.     No friction rub. No gallop.   Pulmonary:      Effort: Pulmonary effort is normal. No respiratory distress.      Breath sounds: Normal breath sounds. No wheezing or rales.   Chest:      Chest wall: No tenderness.   Abdominal:      General: There is no distension.      Palpations: Abdomen is soft.      Tenderness: There is no abdominal tenderness.   Musculoskeletal:         General: Normal range of motion.      Cervical back: Normal range of motion and neck supple.   Lymphadenopathy:      Cervical: No cervical adenopathy.   Skin:     General: Skin is warm and dry.   Neurological:      Mental Status: He is alert and oriented to person, place, and time.      Cranial Nerves: No cranial nerve deficit.         Results Review:   XR Knee 1 or 2 View Right    Result Date: 1/4/2024  Narrative: Comparison: None " Indication: Post op right knee arthroplasty Findings: Portable 2 views of the right knee are obtained.  There is a right knee prosthesis.  There is anatomic alignment.  No evidence of periprosthetic lucency or fracture.    Impression:   Right knee prosthesis in anatomic alignment without evidence of immediate complication. Workstation: [id number]    CT Angiogram Chest    Result Date: 12/28/2023  Narrative: EXAM/TECHNIQUE: CT chest angiography with 3D MIP images with IV contrast  INDICATION: Aortic aneurysm, known or suspected; I71.21-Aneurysm of the ascending aorta, without rupture  COMPARISON: None  DLP: 387.69 mGy.cm. Automated exposure control was also utilized to decrease patient radiation dose.  FINDINGS:  Aortic root is dilated up to 4.7 cm diameter. The ascending aorta is dilated measuring up to 4.2 cm. The aortic arch and descending thoracic aorta are nondilated. Main pulmonary artery is nondilated. No evidence of pulmonary embolus. No pericardial effusion.  The central airways are clear. No consolidation or pleural effusion. Mild bibasilar atelectasis. No advanced emphysematous or fibrotic change. 3 mm right lower lobe pulmonary nodule on image 90. 3 mm left lower lobe pulmonary nodule image 95. 3 mm right lower lobe pulmonary nodule image 115. No enlarged thoracic lymph nodes.  No large thyroid nodule. No acute chest wall soft tissue abnormality. Diffuse hepatic steatosis. Prior cholecystectomy. Partially imaged left renal subcapsular hematoma, likely subacute to chronic given low density, with mild deformation/compression of the left kidney. Chronic severe compression deformity of T12. No acute osseous finding.      Impression:  1.  The ascending aorta is mildly dilated measuring 4.2 cm diameter. The aortic root is dilated up to 4.7 cm diameter.  2.  Multiple small 3 mm pulmonary nodules are present. If the patient has risk factors for pulmonary malignancy, recommend a follow-up chest CT in 1 year based  on Fleischner criteria.  3.  Hepatic steatosis.  4.  Partially imaged left renal subcapsular hematoma, likely subacute to chronic given low density nature of this collection. This collection does deform/compress the left renal parenchyma which can cause hypertension.  This report was signed and finalized on 12/28/2023 9:45 AM by Dr. Jovani Wong MD.          Time-sensitive data might be out of sequence or missing. Information for this patient was recently documented in a time zone different from the current session’s time zone. To edit documentation and ensure all information is up to date, log in from the patient’s time zone.  Patient's time zone: [facility]/[place]   Current session's time zone: [facility]/[place]               File Link    Scan on 8/18/2023 by [employer] [place]: TRANS ECHO-[facility]-8/18/23        Key Information    Document ID File Type Document Type Description   [id number] Image ECHOCARDIOGRAM - SCAN [facility] ECHO-[facility]-8/18/23     Import Information    Attached At Date Time User Dept   Order Level 8/18/2023  [employer]      Order    Scanned - Echocardiogram [[id number]]     Encounter    Scanned Document on 8/18/23 with [employer]       Independent Interpretation: Findings of echocardiogram performed on 08/18/2023 include preserved LV function with grade 1 diastolic dysfunction. The aortic valve is visualized and Tri leaflet with mild regurgitation. No other significant valvular abnormalities.    My independent interpretation of CT angiogram of the chest performed 12/28/2023 reveals a distal ascending aorta measuring 3.6 cm in size in the ascending aorta measuring in greatest dimension 4.3 cm in size. The aortic root measures 4.7 cm in size. This number is correlated with a measurement of a sagittal reconstruction of the root.      I reviewed the patient's new clinical results.  Discussed with patient      Assessment & Plan   Diagnoses and all orders for this visit:    1. Aneurysm of  ascending aorta without rupture (Primary)    2. Aortic root aneurysm        Impression:  1. Aortic root aneurysm.  2. Ascending aortic aneurysm.  3. Hypertension.    Medical decision making/Recommendations/Plan:  He is a non-smoker.  He has been congratulated.  I have answered all questions to the best of my ability.  He is agreeable to the aforementioned plan.  Many thanks, Bc, for the opportunity to aid in the care of your patient.  I will continue to keep you apprised of care as it ensues.    I discussed the patients findings and my recommendations with patient.  We discussed the natural course history of aortic aneurysmal disease. We discussed the specific size of aneurysm today and potential risk of aortic complications. We discussed the operative treatment of aneursymal disease broadly. At this time we discussed the recommendation to plan surveillance with CT scans. We discussed signs and symptoms of acute aortic pathology and the need to present to the emergency department for further evaluation. Lastly, we discussed the value of exercise while being mindful of a known aneurysm and the potential risk that high intensity, isometric, or valsalva type exercises presents.   Potential medical therapy including the use of a beta-blocker and perhaps other agents to accomplish strict control of pressure were discussed.     He is a non smoker.     Transcribed from ambient dictation for Ashwin Cheng MD by Lynette Toledo.  01/17/24   14:53 CST    Patient or patient representative verbalized consent to the visit recording.  I have personally performed the services described in this document as transcribed by the above individual, and it is both accurate and complete.

## 2024-01-19 ENCOUNTER — PATIENT ROUNDING (BHMG ONLY) (OUTPATIENT)
Dept: CARDIAC SURGERY | Facility: CLINIC | Age: 69
End: 2024-01-19
Payer: MEDICARE

## 2024-01-19 NOTE — PROGRESS NOTES
A [employer] message has been sent to the patient for PATIENT ROUNDING with [facility].

## 2024-02-02 PROBLEM — Q25.43 AORTIC ROOT ANEURYSM: Status: ACTIVE | Noted: 2024-02-02

## 2024-02-02 PROBLEM — I71.21 AORTIC ROOT ANEURYSM: Status: ACTIVE | Noted: 2024-02-02

## 2024-02-02 PROBLEM — I71.21 ANEURYSM OF ASCENDING AORTA WITHOUT RUPTURE: Status: ACTIVE | Noted: 2024-02-02

## 2024-03-15 RX ORDER — TAMSULOSIN HYDROCHLORIDE 0.4 MG/1
0.4 CAPSULE ORAL DAILY
COMMUNITY

## 2024-03-15 RX ORDER — FENOFIBRATE 145 MG/1
145 TABLET, COATED ORAL DAILY
COMMUNITY

## 2024-03-15 RX ORDER — MONTELUKAST SODIUM 10 MG/1
10 TABLET ORAL NIGHTLY
COMMUNITY

## 2024-03-15 RX ORDER — ROPINIROLE 0.5 MG/1
0.5 TABLET, FILM COATED ORAL 3 TIMES DAILY
COMMUNITY

## 2024-03-15 RX ORDER — LOSARTAN POTASSIUM 50 MG/1
50 TABLET ORAL DAILY
COMMUNITY

## 2024-03-15 RX ORDER — TRIAMCINOLONE ACETONIDE 40 MG/ML
40 INJECTION, SUSPENSION INTRA-ARTICULAR; INTRAMUSCULAR ONCE
COMMUNITY

## 2024-03-15 RX ORDER — ASPIRIN 81 MG/1
81 TABLET ORAL DAILY
COMMUNITY

## 2024-03-15 RX ORDER — TADALAFIL 20 MG/1
20 TABLET ORAL PRN
COMMUNITY

## 2024-03-15 RX ORDER — EZETIMIBE 10 MG/1
10 TABLET ORAL DAILY
COMMUNITY

## 2024-03-15 RX ORDER — GABAPENTIN 100 MG/1
100 CAPSULE ORAL 3 TIMES DAILY
COMMUNITY

## 2024-03-15 RX ORDER — ESCITALOPRAM OXALATE 20 MG/1
20 TABLET ORAL DAILY
COMMUNITY

## 2024-03-20 ENCOUNTER — OFFICE VISIT (OUTPATIENT)
Dept: NEUROLOGY | Age: 69
End: 2024-03-20
Payer: MEDICARE

## 2024-03-20 VITALS
BODY MASS INDEX: 28.58 KG/M2 | SYSTOLIC BLOOD PRESSURE: 112 MMHG | WEIGHT: 211 LBS | OXYGEN SATURATION: 95 % | HEIGHT: 72 IN | HEART RATE: 92 BPM | DIASTOLIC BLOOD PRESSURE: 73 MMHG

## 2024-03-20 DIAGNOSIS — R06.83 SNORING: ICD-10-CM

## 2024-03-20 DIAGNOSIS — G25.81 RESTLESS LEG SYNDROME: ICD-10-CM

## 2024-03-20 DIAGNOSIS — R06.81 WITNESSED APNEIC SPELLS: ICD-10-CM

## 2024-03-20 DIAGNOSIS — G47.00 INSOMNIA, UNSPECIFIED TYPE: ICD-10-CM

## 2024-03-20 DIAGNOSIS — G47.33 OBSTRUCTIVE SLEEP APNEA: Primary | ICD-10-CM

## 2024-03-20 PROCEDURE — G8419 CALC BMI OUT NRM PARAM NOF/U: HCPCS | Performed by: PHYSICIAN ASSISTANT

## 2024-03-20 PROCEDURE — G8427 DOCREV CUR MEDS BY ELIG CLIN: HCPCS | Performed by: PHYSICIAN ASSISTANT

## 2024-03-20 PROCEDURE — 99203 OFFICE O/P NEW LOW 30 MIN: CPT | Performed by: PHYSICIAN ASSISTANT

## 2024-03-20 PROCEDURE — 1123F ACP DISCUSS/DSCN MKR DOCD: CPT | Performed by: PHYSICIAN ASSISTANT

## 2024-03-20 PROCEDURE — 3017F COLORECTAL CA SCREEN DOC REV: CPT | Performed by: PHYSICIAN ASSISTANT

## 2024-03-20 PROCEDURE — 1036F TOBACCO NON-USER: CPT | Performed by: PHYSICIAN ASSISTANT

## 2024-03-20 PROCEDURE — G8484 FLU IMMUNIZE NO ADMIN: HCPCS | Performed by: PHYSICIAN ASSISTANT

## 2024-03-20 RX ORDER — LOSARTAN POTASSIUM AND HYDROCHLOROTHIAZIDE 25; 100 MG/1; MG/1
1 TABLET ORAL DAILY
COMMUNITY
Start: 2024-01-28

## 2024-03-20 RX ORDER — FLUTICASONE PROPIONATE AND SALMETEROL 232; 14 UG/1; UG/1
1 POWDER, METERED RESPIRATORY (INHALATION) 2 TIMES DAILY
COMMUNITY
Start: 2024-03-01

## 2024-03-20 RX ORDER — ZOLPIDEM TARTRATE 10 MG/1
10 TABLET ORAL DAILY PRN
COMMUNITY

## 2024-03-20 RX ORDER — DULOXETIN HYDROCHLORIDE 30 MG/1
30 CAPSULE, DELAYED RELEASE ORAL DAILY
COMMUNITY

## 2024-03-20 RX ORDER — AMLODIPINE BESYLATE 10 MG/1
10 TABLET ORAL DAILY
COMMUNITY
Start: 2024-01-28

## 2024-03-20 NOTE — PATIENT INSTRUCTIONS
long-term follow-up should be established. Annual visits are reasonable, with more frequent visits in between if new issues arise. The purpose of long-term follow-up is to assess usage and monitor for recurrent KARINA, new side effects, air leakage, and fluctuations in body weight.    WHERE TO GET MORE INFORMATION -- Your healthcare provider is the best source of information for questions and concerns related to your medical problem.    Organizations  American [employer]nea Association  Provides information about sleep apnea to the public, publishes a newsletter, and serves as an advocate for people with the disorder.  [address] [place] [address]  [address]   [place] [address]   alex@sleepapnea.org   http://www.sleepapnea.org   Tel: [phone]   Fax: [phone]   National [employer] Foundation  National nonprofit organization that works to improve public health and safety by promoting public understanding of sleep and sleep disorders. Supports sleep-related education, research, and advocacy; produces and distributes educational materials to the public and healthcare professionals; and offers postdoctoral fellowships and grants for sleep researchers.  [address] [place] [address]  [address]   [place] [address]   nsf@sleepfoundation.org   http://www.sleepfoundation.org   Tel: [phone]   Fax: [phone]    Important information:  Medicare/private insurance CPAP/BiPAP/APAP requirements:  Medicare/private insurance has specific requirements for PAP compliance that must be met during the first 90 days of use to continue coverage for CPAP/BiPAP/APAP  from day 91 and beyond. The policy requires that patients use a PAP device 4 hours per 24 hour period, at least 70% of the time over a 30 day period. This data must be downloaded as a report direct from the PAP devices. This is called a compliance download.  Your PAP supplier will assist you in this matter.     Note:  Where applicable, we will utilize PAP device efficiency

## 2024-03-20 NOTE — PROGRESS NOTES
REVIEW OF SYSTEMS    Constitutional: []Fever []Sweats []Chills [] Recent Injury [x] Denies all unless marked  HEENT:[]Headache  [] Head Injury [] Hearing Loss  [] Sore Throat  [] Ear Ache [x] Denies all unless marked  Spine:  [] Neck pain  [] Back pain  [] Sciaticia  [x] Denies all unless marked  Cardiovascular:[]Heart Disease []Palpitations [] Chest Pain   [x] Denies all unless marked  Pulmonary: []Shortness of Breath []Cough   [x] Denies all unless marked  Psychiatric/Behavioral:[] Depression [] Anxiety [x] Denies all unless marked  Gastrointestinal: []Nausea  []Vomiting  []Abdominal Pain  []Constipation  []Diarrhea  [x] Denies all unless marked  Genitourinary:   [] Frequency  [] Urgency  [] Dysuria [] Incontinence  [x] Denies all unless marked  Extremities: []Pain  []Swelling  [x] Denies all unless marked  Musculoskeletal: [] Myalgias  [] Joint Pain  [] Arthritis [] Muscle Cramps [] Muscle Twitches  [x] Denies all unless marked  Sleep: [x]Insomnia[x]Snoring []Restless Legs  []Sleep Apnea  [x]Daytime Sleepiness  [x] Denies all unless marked  Skin:[] Rash [] Color Change [x] Denies all unless marked   Neurological:[]Visual Disturbance [] Memory Loss []Loss of Balance []Slurred Speech []Weakness []Seizures  [] Dizziness [x] Denies all unless marked      
cooperative with exam  HEENT- Conjunctiva normal.  No scars, masses, or lesions over external nose or ears, hearing intact, no neck masses noted, no jugular vein distension, no bruit  Cardiac- Regular rate and rhythm  Pulmonary- Clear to auscultation, good expansion, normal effort without use of accessory muscles  Musculoskeletal - No significant wasting of muscles noted, no bony deformities  Extremities - No clubbing, cyanosis or edema  Skin - Warm, dry, and intact.  No rash, erythema, or pallor  Psychiatric - Mood, affect, and behavior appear normal      Neurological exam  Awake, alert, fluent oriented x 3 appropriate affect  Attention and concentration appear appropriate  Recent and remote memory appears unremarkable  Speech normal without dysarthria  No clear issues with language of fund of knowledge    Cranial Nerve Exam   CN II- Visual fields grossly unremarkable  CN III, IV,VI-EOMI, No nystagmus, conjugate eye movements, no ptosis  CN VII-No facial assymetry  CN VIII-Hearing  CN IX and X- No palate asymmetry  CN XI-Shoulder shrug intact  CN XII-Tongue midline with no fasciculations or fibrillations    Motor Exam  Antigravity throughout upper and lower extremities bilaterally    Gait  Normal base and speed  No ataxia    I reviewed the following studies:       []  :  Clinical laboratory test results     []  :  Radiology reports                    [x]  :  Review and summarization of medical records-referring provider, Dr. Grzegorz Chau     []     Request for medical records       []  :  Reviewed previous/recent polysomnogram report(s)       [x]  :  De Leon Sleepiness Scale:       De Leon Sleepiness Scale    Rate the likelihood of dozin = would never doze-\"never\"  1 = slight chance of dozing-\"rarely\"  2 = moderate chance of dozing-\"sometimes\"  3 = high chance of dozing-\"always\"    Situation Chance of Dozing (0-3)    Sitting and reading       1    Watching TV        2    Sitting, inactive in a public

## 2024-04-12 ENCOUNTER — HOSPITAL ENCOUNTER (OUTPATIENT)
Dept: SLEEP CENTER | Age: 69
Discharge: HOME | End: 2024-04-14
Payer: MEDICARE

## 2024-04-12 DIAGNOSIS — G47.33 OBSTRUCTIVE SLEEP APNEA: ICD-10-CM

## 2024-04-12 DIAGNOSIS — G47.00 INSOMNIA, UNSPECIFIED TYPE: ICD-10-CM

## 2024-04-12 DIAGNOSIS — R06.81 WITNESSED APNEIC SPELLS: ICD-10-CM

## 2024-04-12 PROCEDURE — G0399 HOME SLEEP TEST/TYPE 3 PORTA: HCPCS

## 2024-04-12 NOTE — PROGRESS NOTES
Mr. Alex Patiño presented today in the sleep center for a Home Sleep Test (HST).  Mr. Patiño was instructed on the device and was requested to wear the unit for two nights. Mr. Patiño was asked to have the HST monitor back by 10AM on  04/152024. The patient acknowledged he/ understood. The HST device was tested and was in working order.

## 2024-04-14 PROCEDURE — G0399 HOME SLEEP TEST/TYPE 3 PORTA: HCPCS

## 2024-04-30 ENCOUNTER — TRANSCRIBE ORDERS (OUTPATIENT)
Dept: ADMINISTRATIVE | Facility: HOSPITAL | Age: 69
End: 2024-04-30
Payer: MEDICARE

## 2024-04-30 DIAGNOSIS — I71.20 THORACIC AORTIC ANEURYSM WITHOUT RUPTURE, UNSPECIFIED PART: ICD-10-CM

## 2024-04-30 DIAGNOSIS — I10 MALIGNANT ESSENTIAL HYPERTENSION: Primary | ICD-10-CM

## 2024-05-02 ENCOUNTER — TRANSCRIBE ORDERS (OUTPATIENT)
Dept: ADMINISTRATIVE | Facility: HOSPITAL | Age: 69
End: 2024-05-02
Payer: MEDICARE

## 2024-05-02 DIAGNOSIS — R42 DIZZINESS AND GIDDINESS: Primary | ICD-10-CM

## 2024-05-10 ENCOUNTER — HOSPITAL ENCOUNTER (OUTPATIENT)
Dept: ULTRASOUND IMAGING | Facility: HOSPITAL | Age: 69
Discharge: HOME | End: 2024-05-10
Payer: MEDICARE

## 2024-05-10 DIAGNOSIS — R42 DIZZINESS AND GIDDINESS: ICD-10-CM

## 2024-05-10 PROCEDURE — 93880 EXTRACRANIAL BILAT STUDY: CPT

## 2024-05-11 DIAGNOSIS — G47.33 SLEEP APNEA, OBSTRUCTIVE: Primary | ICD-10-CM

## 2024-05-14 ENCOUNTER — TELEPHONE (OUTPATIENT)
Dept: SLEEP CENTER | Age: 69
End: 2024-05-14

## 2024-05-14 NOTE — TELEPHONE ENCOUNTER
Spoke to  Alex Patiño and went over the results of his HST performed 04/12/2024 and 04/14/2024.  Questions answered.  Orders, documentation and insurance information were sent to [facility] today.

## 2024-05-15 ENCOUNTER — HOSPITAL ENCOUNTER (OUTPATIENT)
Age: 69
Discharge: HOME | End: 2024-05-17
Payer: MEDICARE

## 2024-05-15 ENCOUNTER — APPOINTMENT (OUTPATIENT)
Dept: NON INVASIVE DIAGNOSTICS | Age: 69
End: 2024-05-15
Payer: MEDICARE

## 2024-05-15 VITALS
SYSTOLIC BLOOD PRESSURE: 131 MMHG | HEIGHT: 72 IN | DIASTOLIC BLOOD PRESSURE: 85 MMHG | WEIGHT: 212 LBS | BODY MASS INDEX: 28.71 KG/M2

## 2024-05-15 DIAGNOSIS — I71.21 ANEURYSM OF ASCENDING AORTA WITHOUT RUPTURE (HCC): ICD-10-CM

## 2024-05-15 DIAGNOSIS — I10 ESSENTIAL HYPERTENSION, BENIGN: ICD-10-CM

## 2024-05-15 LAB
ECHO AO ASC DIAM: 4.2 CM
ECHO AO ASCENDING AORTA INDEX: 1.93 CM/M2
ECHO AO ROOT DIAM: 4.3 CM
ECHO AO ROOT INDEX: 1.97 CM/M2
ECHO AO SINUS VALSALVA DIAM: 4.3 CM
ECHO AO SINUS VALSALVA INDEX: 1.97 CM/M2
ECHO AO ST JNCT DIAM: 3.9 CM
ECHO AR MAX VEL PISA: 4.1 M/S
ECHO AV AREA PEAK VELOCITY: 3.4 CM2
ECHO AV AREA VTI: 3.1 CM2
ECHO AV AREA/BSA PEAK VELOCITY: 1.6 CM2/M2
ECHO AV AREA/BSA VTI: 1.4 CM2/M2
ECHO AV MEAN GRADIENT: 3 MMHG
ECHO AV MEAN VELOCITY: 0.9 M/S
ECHO AV PEAK GRADIENT: 5 MMHG
ECHO AV PEAK VELOCITY: 1.2 M/S
ECHO AV REGURGITANT PHT: 524 MS
ECHO AV VELOCITY RATIO: 0.83
ECHO AV VTI: 26.6 CM
ECHO BSA: 2.21 M2
ECHO EST RA PRESSURE: 3 MMHG
ECHO IVC PROX: 1.6 CM
ECHO LA AREA 2C: 18.2 CM2
ECHO LA AREA 4C: 17.7 CM2
ECHO LA DIAMETER INDEX: 2.25 CM/M2
ECHO LA DIAMETER: 4.9 CM
ECHO LA MAJOR AXIS: 6.3 CM
ECHO LA MINOR AXIS: 5.8 CM
ECHO LA TO AORTIC ROOT RATIO: 1.14
ECHO LA VOL BP: 45 ML (ref 18–58)
ECHO LA VOL MOD A2C: 48 ML (ref 18–58)
ECHO LA VOL MOD A4C: 42 ML (ref 18–58)
ECHO LA VOL/BSA BIPLANE: 21 ML/M2 (ref 16–34)
ECHO LA VOLUME INDEX MOD A2C: 22 ML/M2 (ref 16–34)
ECHO LA VOLUME INDEX MOD A4C: 19 ML/M2 (ref 16–34)
ECHO LV E' LATERAL VELOCITY: 6 CM/S
ECHO LV E' SEPTAL VELOCITY: 5 CM/S
ECHO LV EDV A2C: 100 ML
ECHO LV EDV A4C: 99 ML
ECHO LV EDV INDEX A4C: 45 ML/M2
ECHO LV EDV NDEX A2C: 46 ML/M2
ECHO LV EJECTION FRACTION A2C: 55 %
ECHO LV EJECTION FRACTION A4C: 57 %
ECHO LV EJECTION FRACTION BIPLANE: 54 % (ref 55–100)
ECHO LV ESV A2C: 45 ML
ECHO LV ESV A4C: 43 ML
ECHO LV ESV INDEX A2C: 21 ML/M2
ECHO LV ESV INDEX A4C: 20 ML/M2
ECHO LV FRACTIONAL SHORTENING: 38 % (ref 28–44)
ECHO LV GLOBAL LONGITUDINAL STRAIN (GLS): -16 %
ECHO LV INTERNAL DIMENSION DIASTOLE INDEX: 2.16 CM/M2
ECHO LV INTERNAL DIMENSION DIASTOLIC: 4.7 CM (ref 4.2–5.9)
ECHO LV INTERNAL DIMENSION SYSTOLIC INDEX: 1.33 CM/M2
ECHO LV INTERNAL DIMENSION SYSTOLIC: 2.9 CM
ECHO LV ISOVOLUMETRIC RELAXATION TIME (IVRT): 113 MS
ECHO LV IVSD: 1.1 CM (ref 0.6–1)
ECHO LV MASS 2D: 175.8 G (ref 88–224)
ECHO LV MASS INDEX 2D: 80.7 G/M2 (ref 49–115)
ECHO LV POSTERIOR WALL DIASTOLIC: 1 CM (ref 0.6–1)
ECHO LV RELATIVE WALL THICKNESS RATIO: 0.43
ECHO LVOT AREA: 3.8 CM2
ECHO LVOT AV VTI INDEX: 0.83
ECHO LVOT DIAM: 2.2 CM
ECHO LVOT MEAN GRADIENT: 2 MMHG
ECHO LVOT PEAK GRADIENT: 4 MMHG
ECHO LVOT PEAK VELOCITY: 1 M/S
ECHO LVOT STROKE VOLUME INDEX: 38.3 ML/M2
ECHO LVOT SV: 83.6 ML
ECHO LVOT VTI: 22 CM
ECHO MV A VELOCITY: 0.85 M/S
ECHO MV ANNULUS DIAMETER: 3.1 CM
ECHO MV AREA VTI: 4 CM2
ECHO MV E DECELERATION TIME (DT): 278 MS
ECHO MV E VELOCITY: 0.57 M/S
ECHO MV E/A RATIO: 0.67
ECHO MV E/E' LATERAL: 9.5
ECHO MV E/E' RATIO (AVERAGED): 10.45
ECHO MV E/E' SEPTAL: 11.4
ECHO MV LVOT VTI INDEX: 0.95
ECHO MV MAX VELOCITY: 0.8 M/S
ECHO MV MEAN GRADIENT: 1 MMHG
ECHO MV MEAN VELOCITY: 0.4 M/S
ECHO MV PEAK GRADIENT: 2 MMHG
ECHO MV VTI: 20.8 CM
ECHO RA AREA 4C: 10.7 CM2
ECHO RA END SYSTOLIC VOLUME APICAL 4 CHAMBER INDEX BSA: 9 ML/M2
ECHO RA MAJOR AXIS INDEX: 2.61 CM/M2
ECHO RA MAJOR AXIS: 5.7 CM
ECHO RA MINOR AXIS INDEX: 0.96 CM/M2
ECHO RA MINOR AXIS: 2.1 CM
ECHO RA VOLUME: 19 ML
ECHO RIGHT VENTRICULAR SYSTOLIC PRESSURE (RVSP): 22 MMHG
ECHO RV BASAL DIMENSION: 2.7 CM
ECHO RV INTERNAL DIMENSION: 4.1 CM
ECHO RV LONGITUDINAL DIMENSION: 7.8 CM
ECHO RV MID DIMENSION: 3.5 CM
ECHO RV TAPSE: 1.8 CM (ref 1.7–?)
ECHO TV REGURGITANT MAX VELOCITY: 2.2 M/S
ECHO TV REGURGITANT PEAK GRADIENT: 19 MMHG

## 2024-05-15 PROCEDURE — 93306 TTE W/DOPPLER COMPLETE: CPT

## 2024-06-07 ENCOUNTER — OFFICE VISIT (OUTPATIENT)
Dept: CARDIOLOGY | Facility: CLINIC | Age: 69
End: 2024-06-07
Payer: MEDICARE

## 2024-06-07 VITALS
SYSTOLIC BLOOD PRESSURE: 125 MMHG | DIASTOLIC BLOOD PRESSURE: 80 MMHG | RESPIRATION RATE: 18 BRPM | WEIGHT: 223 LBS | OXYGEN SATURATION: 97 % | HEART RATE: 78 BPM | HEIGHT: 72 IN | BODY MASS INDEX: 30.2 KG/M2

## 2024-06-07 DIAGNOSIS — G47.30 SLEEP APNEA, UNSPECIFIED TYPE: ICD-10-CM

## 2024-06-07 DIAGNOSIS — I71.21 AORTIC ROOT ANEURYSM: ICD-10-CM

## 2024-06-07 DIAGNOSIS — R00.2 PALPITATIONS: Primary | ICD-10-CM

## 2024-06-07 DIAGNOSIS — I10 PRIMARY HYPERTENSION: ICD-10-CM

## 2024-06-07 DIAGNOSIS — E78.2 MIXED HYPERLIPIDEMIA: ICD-10-CM

## 2024-06-07 DIAGNOSIS — I71.21 ANEURYSM OF ASCENDING AORTA WITHOUT RUPTURE: ICD-10-CM

## 2024-06-07 PROCEDURE — 1160F RVW MEDS BY RX/DR IN RCRD: CPT | Performed by: NURSE PRACTITIONER

## 2024-06-07 PROCEDURE — 3074F SYST BP LT 130 MM HG: CPT | Performed by: NURSE PRACTITIONER

## 2024-06-07 PROCEDURE — 3079F DIAST BP 80-89 MM HG: CPT | Performed by: NURSE PRACTITIONER

## 2024-06-07 PROCEDURE — 99214 OFFICE O/P EST MOD 30 MIN: CPT | Performed by: NURSE PRACTITIONER

## 2024-06-07 PROCEDURE — 1159F MED LIST DOCD IN RCRD: CPT | Performed by: NURSE PRACTITIONER

## 2024-06-07 RX ORDER — LOSARTAN POTASSIUM AND HYDROCHLOROTHIAZIDE 25; 100 MG/1; MG/1
1 TABLET ORAL DAILY
COMMUNITY
Start: 2024-01-28

## 2024-06-07 NOTE — PROGRESS NOTES
Subjective:     Encounter Date:06/07/2024      Patient ID: John Pereira is a 69 y.o. male     Chief Complaint:  History of Present Illness  Patient presents today for routine cardiology follow up. Patient is followed for palpitations, hypertension and paroxysmal atrial fibrillation. Patient also has ascending aorta aneurysm and aortic root aneurysm. He was referred to Dr. Cheng earlier this year for this. He had an echo done at [facility] and was stable with no acute findings. He also has type II diabetes, hyperlipidemia, erectile dysfunction and sleep apnea. Sleep apnea is new diagnosis and he has started CPAP. He notes he is starting to tolerate better. He does note this makes him feel better. He has followed with Dr. Vishal Roach for some time but notes he has retried.     The following portions of the patient's history were reviewed and updated as appropriate: allergies, current medications, past medical history, past social history, past and problem list.    Allergies   Allergen Reactions    Ciprofloxacin Other (See Comments)     Caused achilles tendonitis       Current Outpatient Medications:     aspirin 81 MG EC tablet, Take 1 tablet by mouth Daily., Disp: , Rfl:     Budesonide-Formoterol Fumarate (SYMBICORT IN), 2 puffs As Needed., Disp: , Rfl:     DULoxetine (CYMBALTA) 30 MG capsule, 1 capsule Daily., Disp: , Rfl:     ezetimibe (ZETIA) 10 MG tablet, Take 1 tablet by mouth every night at bedtime., Disp: , Rfl:     fenofibrate (TRICOR) 145 MG tablet, Take 1 tablet by mouth Every Evening., Disp: , Rfl:     gabapentin (NEURONTIN) 100 MG capsule, Take 1 capsule by mouth every night at bedtime., Disp: , Rfl:     HYDROcodone-acetaminophen (NORCO) 7.5-325 MG per tablet, Take 1 tablet by mouth Every 6 (Six) Hours As Needed for Moderate Pain., Disp: , Rfl:     losartan-hydrochlorothiazide (HYZAAR) 100-25 MG per tablet, Take 1 tablet by mouth Daily., Disp: , Rfl:     metFORMIN (GLUCOPHAGE) 500 MG tablet, Take 1  tablet by mouth., Disp: , Rfl:     metoprolol tartrate (LOPRESSOR) 25 MG tablet, Take 1 tablet by mouth 2 (Two) Times a Day., Disp: 60 tablet, Rfl: 11    montelukast (SINGULAIR) 10 MG tablet, Take 1 tablet by mouth every night at bedtime., Disp: , Rfl:     rOPINIRole (REQUIP) 0.5 MG tablet, Take 1 tablet by mouth Daily., Disp: , Rfl:     tadalafil (CIALIS) 20 MG tablet, Take As Directed., Disp: , Rfl:     tamsulosin (FLOMAX) 0.4 MG capsule 24 hr capsule, Take 1 capsule by mouth Daily., Disp: , Rfl:     triamcinolone acetonide (Kenalog-40) 40 MG/ML injection, Inject 1 mL into the appropriate muscle as directed by prescriber Every 3 (Three) Months., Disp: , Rfl:     zolpidem (AMBIEN) 10 MG tablet, Take 1 tablet by mouth At Night As Needed for Sleep., Disp: , Rfl:     Social History     Socioeconomic History    Marital status:    Tobacco Use    Smoking status: Never     Passive exposure: Never    Smokeless tobacco: Never   Vaping Use    Vaping status: Never Used   Substance and Sexual Activity    Alcohol use: Yes     Comment: Occ    Drug use: Defer    Sexual activity: Defer       Review of Systems   Constitutional: Negative for chills, decreased appetite, fever, malaise/fatigue, weight gain and weight loss.   HENT:  Negative for nosebleeds.    Eyes:  Negative for visual disturbance.   Cardiovascular:  Negative for chest pain, dyspnea on exertion, leg swelling, near-syncope, orthopnea, palpitations, paroxysmal nocturnal dyspnea and syncope.   Respiratory:  Negative for cough, hemoptysis, shortness of breath and snoring.    Endocrine: Negative for cold intolerance and heat intolerance.   Hematologic/Lymphatic: Negative for bleeding problem. Does not bruise/bleed easily.   Skin:  Negative for rash.   Musculoskeletal:  Negative for back pain and falls.   Gastrointestinal:  Negative for abdominal pain, constipation, diarrhea, heartburn, melena, nausea and vomiting.   Genitourinary:  Negative for hematuria.  "  Neurological:  Negative for dizziness, headaches and light-headedness.   Psychiatric/Behavioral:  Negative for altered mental status.    Allergic/Immunologic: Negative for persistent infections.              Objective:     Constitutional:       Appearance: Healthy appearance. Well-developed and not in distress.   Eyes:      Pupils: Pupils are equal, round, and reactive to light.   HENT:      Head: Normocephalic and atraumatic.   Neck:      Vascular: No carotid bruit or JVD.   Pulmonary:      Effort: Pulmonary effort is normal.      Breath sounds: Normal breath sounds.   Cardiovascular:      Normal rate. Regular rhythm.      Murmurs: There is no murmur.   Pulses:     Intact distal pulses.   Edema:     Peripheral edema absent.   Abdominal:      General: Bowel sounds are normal.      Palpations: Abdomen is soft.   Musculoskeletal: Normal range of motion.      Cervical back: Normal range of motion and neck supple. Skin:     General: Skin is warm and dry.   Neurological:      Mental Status: Alert and oriented to person, place, and time.      Deep Tendon Reflexes: Reflexes are normal and symmetric.   Psychiatric:         Behavior: Behavior normal.         Thought Content: Thought content normal.         Judgment: Judgment normal.           Procedures  /80 (BP Location: Right arm, Patient Position: Sitting, Cuff Size: Adult)   Pulse 78   Resp 18   Ht 182.9 cm (72\")   Wt 101 kg (223 lb)   SpO2 97%   BMI 30.24 kg/m²   Lab Review:   I have reviewed   Echo done 5/15/24        No results found for: \"CHOL\", \"CHLPL\", \"TRIG\", \"HDL\", \"LDL\", \"LDLDIRECT\"   Results for orders placed during the hospital encounter of 11/06/23    Adult Stress Echo W/ Cont or Stress Agent if Necessary Per Protocol    Interpretation Summary    Left ventricular ejection fraction appears to be 56 - 60%.    The patient denied chest pain.    Low risk stress test for stress induced myocardial ischemia        All echocardiographic phases visualized " which shows increase in contractility wall motion and thickness both globally and regionally suggestive of no obvious evidence of stress-induced ischemia    ____________________________________________________________________  Disclaimer:    Despite low risk stress test for stress induced myocardial ischemia, there is a small but definite fraction of patients who will have significant underlying coronary artery disease that needs further evaluation and definitive treatment.    Missing significant coronary artery disease may result in increased morbidity and mortality.  In view of this if any symptoms such as chest pain, shortness of breath, increasing weakness, feeling dizzy, passing out, palpitations, cold sweats etc.,to seek immediate medical help.    Stress test is intrinsically limited and therefore the results do not confirm or rule out presence of coronary artery disease and need to be interpreted on the basis of presentation and overall clinical picture.  ______________________________________________________________________     Assessment:          Diagnosis Plan   1. Palpitations        2. Mixed hyperlipidemia        3. Primary hypertension        4. Aortic root aneurysm        5. Aneurysm of ascending aorta without rupture        6. Sleep apnea, unspecified type               Plan:       Palpitations well controlled on Lopressor.  Mixed Hyperlipidemia - On Tricor, Zetia. Followed by PCP  Hypertension- blood pressure controlled  Aortic Root Aneurysm- now following with [facility]  Ascending aortic aneurysm- now following with [facility]. Notes follow up CT later this year  Sleep Apnea- new diagnosis and has started treatment. Beginning to tolerate mask better.    Follow up in 1 year or sooner if needed.

## 2024-07-17 ENCOUNTER — OFFICE VISIT (OUTPATIENT)
Dept: CARDIAC SURGERY | Facility: CLINIC | Age: 69
End: 2024-07-17
Payer: MEDICARE

## 2024-07-17 ENCOUNTER — HOSPITAL ENCOUNTER (OUTPATIENT)
Dept: CT IMAGING | Facility: HOSPITAL | Age: 69
Discharge: HOME | End: 2024-07-17
Admitting: THORACIC SURGERY (CARDIOTHORACIC VASCULAR SURGERY)
Payer: MEDICARE

## 2024-07-17 VITALS
WEIGHT: 225.2 LBS | BODY MASS INDEX: 30.5 KG/M2 | OXYGEN SATURATION: 96 % | HEIGHT: 72 IN | HEART RATE: 96 BPM | DIASTOLIC BLOOD PRESSURE: 85 MMHG | SYSTOLIC BLOOD PRESSURE: 124 MMHG

## 2024-07-17 DIAGNOSIS — I71.21 AORTIC ROOT ANEURYSM: ICD-10-CM

## 2024-07-17 DIAGNOSIS — I71.21 ANEURYSM OF ASCENDING AORTA WITHOUT RUPTURE: Primary | ICD-10-CM

## 2024-07-17 DIAGNOSIS — I77.810 AORTIC ROOT DILATION: ICD-10-CM

## 2024-07-17 LAB — CREAT BLDA-MCNC: 1.3 MG/DL (ref 0.6–1.3)

## 2024-07-17 PROCEDURE — 1160F RVW MEDS BY RX/DR IN RCRD: CPT | Performed by: THORACIC SURGERY (CARDIOTHORACIC VASCULAR SURGERY)

## 2024-07-17 PROCEDURE — 3074F SYST BP LT 130 MM HG: CPT | Performed by: THORACIC SURGERY (CARDIOTHORACIC VASCULAR SURGERY)

## 2024-07-17 PROCEDURE — 25510000001 IOPAMIDOL PER 1 ML: Performed by: THORACIC SURGERY (CARDIOTHORACIC VASCULAR SURGERY)

## 2024-07-17 PROCEDURE — 3079F DIAST BP 80-89 MM HG: CPT | Performed by: THORACIC SURGERY (CARDIOTHORACIC VASCULAR SURGERY)

## 2024-07-17 PROCEDURE — 71275 CT ANGIOGRAPHY CHEST: CPT

## 2024-07-17 PROCEDURE — 82565 ASSAY OF CREATININE: CPT

## 2024-07-17 PROCEDURE — 99213 OFFICE O/P EST LOW 20 MIN: CPT | Performed by: THORACIC SURGERY (CARDIOTHORACIC VASCULAR SURGERY)

## 2024-07-17 PROCEDURE — 1159F MED LIST DOCD IN RCRD: CPT | Performed by: THORACIC SURGERY (CARDIOTHORACIC VASCULAR SURGERY)

## 2024-07-17 RX ORDER — INSULIN GLARGINE 100 [IU]/ML
INJECTION, SOLUTION SUBCUTANEOUS
COMMUNITY
Start: 2024-06-25

## 2024-07-17 RX ADMIN — IOPAMIDOL 100 ML: 755 INJECTION, SOLUTION INTRAVENOUS at 09:00

## 2024-07-17 NOTE — PROGRESS NOTES
Subjective   Patient ID: John Pereira is a 69 y.o. male who is here for follow-up of a known aneurysm.   Chief Complaint   Patient presents with    Aortic Aneurysm     Patient is here for follow up w/CT      History of Present Illness  The patient is a 68-year-old male who presents for follow-up of ascending aortic aneurysm and aortic root aneurysm.    He reports feeling fatigued due to his busy work schedule and hopes the results of his recent CT scan. He denies experiencing any chest pain. He has been mindful of his lifting activities and aims to maintain a weight of approximately 30 pounds. He recently had a follow-up with Dr. Gasca, who advised him to return if any issues arise. His primary concern is maintaining his aneurysm and whether surgery could be considered if his aorta measures 5 cm. His diabetes is well controlled. 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 He also inquired about the possibility of erectile dysfunction medication. He has been going to the gym, performing light weights, bench presses 20 times, and 30 minutes on the treadmill to maintain his strength.    The following portions of the patient's history were reviewed and updated as appropriate: allergies, current medications, past family history, past medical history, past social history, past surgical history and problem list.       Objective   Physical Exam  Physical Exam  No acute distress, appropriate.  Lungs are clear to auscultation bilaterally without wheezing, rubs, or rales.  Heart has a regular rate and rhythm without murmurs, rubs, or gallops.  Abdomen is soft, nondistended, nontender.  Extremities show no clubbing, cyanosis, or edema.      CT Angiogram Chest    Result Date: 7/17/2024  Narrative: EXAM: CT ANGIOGRAM CHEST- - 7/17/2024 7:51 AM  HISTORY: Aortic Root Dilation; I77.810-Thoracic aortic ectasia   COMPARISON:  12/2/2023.  DOSE LENGTH PRODUCT: 516.42 mGy.cm. Automatic exposure control was utilized to make radiation dose as low as reasonably achievable.  TECHNIQUE: Enhanced axial CT images obtained with multiplanar reformats. 3D postprocessing, including MIPs, performed and images saved to PACS.  FINDINGS: AIRWAYS/PULMONARY PARENCHYMA: The central airways are midline and patent. No pleural effusion or pneumothorax.  Similar RIGHT lower lobe 3 mm pleural-based pulmonary nodule axial series 6, image 71, compared to 12/28/2023. There are additional tiny pulmonary nodules, which appear similar to 12/28/2023. No new suspicious pulmonary finding.  VESSELS: Contrast bolus is adequate. Aortic root measures 4.7 cm. Ascending thoracic aorta measures 4.2 cm, previously similar on 4/28/2023 mild calcified aortic atherosclerosis. Normal caliber main pulm artery. No evidence of pulmonary embolism.  CARDIAC: Borderline heart size. No pericardial effusion.   MEDIASTINUM: There is no mediastinal or hilar lymphadenopathy by CT size criteria. Esophagus has normal coarse, caliber and wall thickness.  EXTRATHORACIC: The visualized portions of the thyroid gland are unremarkable. No thoracic inlet or axillary adenopathy. The extrathoracic soft tissues are within normal limits.  INCLUDED UPPER ABDOMEN: Cholecystomy clips. Low-density of the liver, may be fatty liver or exaggerated by phase of gene. No bile duct dilation. Visualized portion of the pancreas, spleen, adrenal glands appear within normal limits. Previously demonstrated LEFT subcapsular fluid appears decreased compared to prior. There is some low-density of the LEFT renal cortex posteriorly.  RIGHT kidney with an exophytic 1.5 cm lesion measuring 26 Hounsfield units, indeterminate.  OSSEOUS: Severe height loss of T 12, similar compared to 12/20/2023. There is also mild height loss of T4. No new suspicious bony finding.       Impression: 1. Ascending thoracic aorta measures 4.2 cm and  "aortic root measures 4.7 cm, similar compared to 12/28/2023. 2. Similar small scattered pulmonary nodules compared to prior. 3. Indeterminant exophytic renal lesion measuring 1.5 cm and 26 Hounsfield units. Recommend CT abdomen without and with contrast for further evaluation. There is also decreased LEFT renal subcapsular fluid with an area of renal cortical hypoenhancement, which could also be evaluated.  Exam was tagged in PACS with \"incidental findings\" to assist in appropriate follow up.  This report was signed and finalized on 7/17/2024 12:58 PM by Dr Mayra Romero MD.     Results  Independent Review of Radiographic Studies:    CTA chest reveals a distal ascending aorta measuring 3 cm in size, the ascending aorta at the greatest dimension measures 4.2 cm in size, and the aortic root measures 4.9 cm in size. On sagittal reconstructions, the root measures 4.7 cm previously with good concordance with sagittal reconstruction.    Diagnoses and all orders for this visit:    1. Aneurysm of ascending aorta without rupture (Primary)  -     CT Angiogram Chest; Future    2. Aortic root aneurysm  -     CT Angiogram Chest; Future          Assessment & Plan     Assessment & Plan  1. Ascending aortic aneurysm and aortic root aneurysm.  Axial imaging is concordant with previous imaging, however, sagittal imaging suggests a measurement of 2 mm greater at 4.9. Many thanks again for the opportunity of the care of your kind patient. At this point, I recommend remaining conservative from an aneurysm point of view. That being said, we did spend some time discussing his aortic restrictions, which he is maintaining. We also discussed the operative and expected postoperative recovery of aneurysmectomy. He is familiar with acute aortic syndrome, signs and symptoms.     I answered all questions to the best of my ability.  He is a non-smoker.    Follow-up  The patient will follow up in 6 months with a repeat CT of the chest.     "       Patient or patient representative verbalized consent for the use of Ambient Listening during the visit with  Ashwin Cheng MD for chart documentation. 8/11/2024  17:09 CDT

## 2024-07-26 NOTE — PROGRESS NOTES
Subjective    Mr. Pereira is 69 y.o. male    Chief Complaint: Erectile Dysfunction    History of Present Illness  Patient is 69-year-old gentleman who presents for discussion of erectile dysfunction with primarily his penis he does have a slight left curving penis.  Also there is an indentation at the left base of his penis.  When he has an erection he feels a slight tightness or constriction in that area.  Patient cannot remember any obvious precipitating event or injury prior to this starting.  Cialis has been effective for his erections.  He stated because he suddenly noticed this indentation and the curvature he is worried there is something else that could be going on.      The following portions of the patient's history were reviewed and updated as appropriate: allergies, current medications, past family history, past medical history, past social history, past surgical history and problem list.    Review of Systems   Genitourinary:         Penile curvature and abnormality         Current Outpatient Medications:     aspirin 81 MG EC tablet, Take 1 tablet by mouth Daily., Disp: , Rfl:     Budesonide-Formoterol Fumarate (SYMBICORT IN), 2 puffs As Needed., Disp: , Rfl:     DULoxetine (CYMBALTA) 30 MG capsule, 1 capsule Daily., Disp: , Rfl:     ezetimibe (ZETIA) 10 MG tablet, Take 1 tablet by mouth every night at bedtime., Disp: , Rfl:     fenofibrate (TRICOR) 145 MG tablet, Take 1 tablet by mouth Every Evening., Disp: , Rfl:     gabapentin (NEURONTIN) 100 MG capsule, Take 1 capsule by mouth every night at bedtime., Disp: , Rfl:     HYDROcodone-acetaminophen (NORCO) 7.5-325 MG per tablet, Take 1 tablet by mouth Every 6 (Six) Hours As Needed for Moderate Pain., Disp: , Rfl:     Lantus SoloStar 100 UNIT/ML injection pen, , Disp: , Rfl:     losartan-hydrochlorothiazide (HYZAAR) 100-25 MG per tablet, Take 1 tablet by mouth Daily., Disp: , Rfl:     metFORMIN (GLUCOPHAGE) 500 MG tablet, Take 1 tablet by mouth., Disp: ,  "Rfl:     metoprolol tartrate (LOPRESSOR) 25 MG tablet, Take 1 tablet by mouth 2 (Two) Times a Day., Disp: 60 tablet, Rfl: 11    montelukast (SINGULAIR) 10 MG tablet, Take 1 tablet by mouth every night at bedtime., Disp: , Rfl:     rOPINIRole (REQUIP) 0.5 MG tablet, Take 1 tablet by mouth Daily., Disp: , Rfl:     tadalafil (CIALIS) 20 MG tablet, Take As Directed., Disp: , Rfl:     tamsulosin (FLOMAX) 0.4 MG capsule 24 hr capsule, Take 1 capsule by mouth Daily., Disp: , Rfl:     triamcinolone acetonide (Kenalog-40) 40 MG/ML injection, Inject 1 mL into the appropriate muscle as directed by prescriber Every 3 (Three) Months., Disp: , Rfl:     zolpidem (AMBIEN) 10 MG tablet, Take 1 tablet by mouth At Night As Needed for Sleep., Disp: , Rfl:     Past Medical History:   Diagnosis Date    Arthritis     History of squamous cell carcinoma     Hyperlipidemia     Hypertension     Restless legs syndrome (RLS)        Past Surgical History:   Procedure Laterality Date    ACHILLES TENDON REPAIR      MANDIBLE SURGERY      SQUAMOUS CELL CARCINOMA EXCISION      From face       Social History     Socioeconomic History    Marital status:    Tobacco Use    Smoking status: Never     Passive exposure: Never    Smokeless tobacco: Never   Vaping Use    Vaping status: Never Used   Substance and Sexual Activity    Alcohol use: Yes     Comment: Occ    Drug use: Defer    Sexual activity: Defer       Family History   Problem Relation Age of Onset    Colon polyps Neg Hx     Colon cancer Neg Hx     Esophageal cancer Neg Hx     Liver cancer Neg Hx     Stomach cancer Neg Hx     Rectal cancer Neg Hx     Liver disease Neg Hx        Objective    Temp 98.2 °F (36.8 °C)   Ht 182.9 cm (72\")   Wt 101 kg (223 lb)   BMI 30.24 kg/m²     Physical Exam  Vitals reviewed.   Constitutional:       Appearance: Normal appearance.   HENT:      Head: Normocephalic and atraumatic.   Pulmonary:      Effort: Pulmonary effort is normal.   Genitourinary:     " Comments: No palpable abnormality with examination  Skin:     Coloration: Skin is not pale.   Neurological:      Mental Status: He is alert.   Psychiatric:         Mood and Affect: Mood normal.         Behavior: Behavior normal.             Results for orders placed or performed in visit on 08/06/24   POC Urinalysis Dipstick, Multipro    Specimen: Urine   Result Value Ref Range    Color Yellow Yellow, Straw, Dark Yellow, Nhung    Clarity, UA Clear Clear    Glucose, UA Negative Negative mg/dL    Bilirubin Negative Negative    Ketones, UA Negative Negative    Specific Gravity  1.030 1.005 - 1.030    Blood, UA Negative Negative    pH, Urine 6.0 5.0 - 8.0    Protein, POC Negative Negative mg/dL    Urobilinogen, UA 0.2 E.U./dL Normal, 0.2 E.U./dL    Nitrite, UA Negative Negative    Leukocytes Negative Negative     Assessment and Plan    Diagnoses and all orders for this visit:    1. Erectile dysfunction due to arterial insufficiency (Primary)  -     POC Urinalysis Dipstick, Multipro    2. Penile curvature, acquired  -     MRI pelvis wo contrast; Future    3. Penile abnormality  -     MRI pelvis wo contrast; Future      No palpable abnormality with examination.  Course not being erect she is difficult to currently assess.  Given the penile abnormality of the indentation in the curvature patient is concerned and after discussion we will go ahead and schedule him for MRI of the pelvis with attention to the penis.  Return to discuss findings.    He continues Cialis for his erectile dysfunction

## 2024-08-06 ENCOUNTER — OFFICE VISIT (OUTPATIENT)
Dept: UROLOGY | Facility: CLINIC | Age: 69
End: 2024-08-06
Payer: MEDICARE

## 2024-08-06 VITALS — BODY MASS INDEX: 30.2 KG/M2 | WEIGHT: 223 LBS | HEIGHT: 72 IN | TEMPERATURE: 98.2 F

## 2024-08-06 DIAGNOSIS — N48.89 PENILE CURVATURE, ACQUIRED: ICD-10-CM

## 2024-08-06 DIAGNOSIS — N52.01 ERECTILE DYSFUNCTION DUE TO ARTERIAL INSUFFICIENCY: Primary | ICD-10-CM

## 2024-08-06 DIAGNOSIS — N48.9 PENILE ABNORMALITY: ICD-10-CM

## 2024-08-06 LAB
BILIRUB BLD-MCNC: NEGATIVE MG/DL
CLARITY, POC: CLEAR
COLOR UR: YELLOW
GLUCOSE UR STRIP-MCNC: NEGATIVE MG/DL
KETONES UR QL: NEGATIVE
LEUKOCYTE EST, POC: NEGATIVE
NITRITE UR-MCNC: NEGATIVE MG/ML
PH UR: 6 [PH] (ref 5–8)
PROT UR STRIP-MCNC: NEGATIVE MG/DL
RBC # UR STRIP: NEGATIVE /UL
SP GR UR: 1.03 (ref 1–1.03)
UROBILINOGEN UR QL: NORMAL

## 2024-08-12 ENCOUNTER — TELEPHONE (OUTPATIENT)
Dept: UROLOGY | Facility: CLINIC | Age: 69
End: 2024-08-12
Payer: MEDICARE

## 2024-08-12 NOTE — TELEPHONE ENCOUNTER
Called patient and left a voicemail with patient's follow-up information after his scheduled MRI.  Patient is scheduled to see Hesham Ennis on 9/18/24 at 10:20am.  I told him in the voicemail if this time/date doesn't work for him to let our office know and we can move it around.    Hub is okay to reschedule this appointment date/time if patient calls back.  Please keep it as close as possible to 2 weeks away from the MRI appointment on 9/4/24, per Hesham's notes.

## 2024-08-12 NOTE — TELEPHONE ENCOUNTER
----- Message from Judit KINGSLEY sent at 8/6/2024 10:03 AM CDT -----  Pt needs follow-up with svetlana 2 weeks after MRI is scheduled

## 2024-09-27 NOTE — PROGRESS NOTES
Subjective    Mr. Pereira is 69 y.o. male    Chief Complaint: Erectile dysfunction/discuss MRI    History of Present Illness  Patient presents for follow-up both with erectile dysfunction also to discuss MRI results for evaluation of slight penile curvature and misshapened penis that he describes as pistol- or hourglass.  The pelvis was done with attention to the penis.  There is no obvious penile fracture or defect at the tunica albuginea.  There was some mild thickening of the dorsal aspect of the tunica albuginea bilaterally right greater than left at the base of the penis.  No focal plaque or calcification is definitely identified.  Also distal tip of the penis is slightly tilted to the left.  Other findings patient states it is still there and bothersome but he also wanted to discuss erectile dysfunction.    Failed on oral medications his main complaint is attaining and maintaining an erection.  He is interested in the vacuum erectile device that we had discussed at last visit.  The [employer] bradycardia device is what we recommend.  Wants to go ahead and order.    The following portions of the patient's history were reviewed and updated as appropriate: allergies, current medications, past family history, past medical history, past social history, past surgical history and problem list.    Review of Systems   Constitutional: Negative.    Genitourinary: Negative.          Current Outpatient Medications:     aspirin 81 MG EC tablet, Take 1 tablet by mouth Daily., Disp: , Rfl:     Budesonide-Formoterol Fumarate (SYMBICORT IN), 2 puffs As Needed., Disp: , Rfl:     DULoxetine (CYMBALTA) 30 MG capsule, 1 capsule Daily., Disp: , Rfl:     ezetimibe (ZETIA) 10 MG tablet, Take 1 tablet by mouth every night at bedtime., Disp: , Rfl:     fenofibrate (TRICOR) 145 MG tablet, Take 1 tablet by mouth Every Evening., Disp: , Rfl:     gabapentin (NEURONTIN) 100 MG capsule, Take 1 capsule by mouth every  night at bedtime., Disp: , Rfl:     Lantus SoloStar 100 UNIT/ML injection pen, , Disp: , Rfl:     losartan-hydrochlorothiazide (HYZAAR) 100-25 MG per tablet, Take 1 tablet by mouth Daily., Disp: , Rfl:     metFORMIN (GLUCOPHAGE) 500 MG tablet, Take 1 tablet by mouth., Disp: , Rfl:     metoprolol tartrate (LOPRESSOR) 25 MG tablet, Take 1 tablet by mouth 2 (Two) Times a Day., Disp: 60 tablet, Rfl: 11    montelukast (SINGULAIR) 10 MG tablet, Take 1 tablet by mouth every night at bedtime., Disp: , Rfl:     rOPINIRole (REQUIP) 0.5 MG tablet, Take 1 tablet by mouth Daily., Disp: , Rfl:     tadalafil (CIALIS) 20 MG tablet, Take As Directed., Disp: , Rfl:     tamsulosin (FLOMAX) 0.4 MG capsule 24 hr capsule, Take 1 capsule by mouth Daily., Disp: , Rfl:     triamcinolone acetonide (Kenalog-40) 40 MG/ML injection, Inject 1 mL into the appropriate muscle as directed by prescriber Every 3 (Three) Months., Disp: , Rfl:     zolpidem (AMBIEN) 10 MG tablet, Take 1 tablet by mouth At Night As Needed for Sleep., Disp: , Rfl:     HYDROcodone-acetaminophen (NORCO) 7.5-325 MG per tablet, Take 1 tablet by mouth Every 6 (Six) Hours As Needed for Moderate Pain. (Patient not taking: Reported on 10/16/2024), Disp: , Rfl:     Past Medical History:   Diagnosis Date    Arthritis     Diabetes mellitus     Erectile dysfunction     History of squamous cell carcinoma     Hyperlipidemia     Hypertension     Restless legs syndrome (RLS)        Past Surgical History:   Procedure Laterality Date    ACHILLES TENDON REPAIR      MANDIBLE SURGERY      SQUAMOUS CELL CARCINOMA EXCISION      From face    VASECTOMY         Social History     Socioeconomic History    Marital status:    Tobacco Use    Smoking status: Never     Passive exposure: Never    Smokeless tobacco: Never   Vaping Use    Vaping status: Never Used   Substance and Sexual Activity    Alcohol use: Yes     Alcohol/week: 4.0 standard drinks of alcohol     Types: 2 Glasses of wine, 2 Cans  "of beer per week     Comment: Occ    Drug use: Never    Sexual activity: Not Currently     Partners: Female     Birth control/protection: Vasectomy       Family History   Problem Relation Age of Onset    Cancer Mother     Heart disease Mother     Hypertension Mother     Colon polyps Neg Hx     Colon cancer Neg Hx     Esophageal cancer Neg Hx     Liver cancer Neg Hx     Stomach cancer Neg Hx     Rectal cancer Neg Hx     Liver disease Neg Hx        Objective    Temp 97.8 °F (36.6 °C)   Ht 182.9 cm (72\")   Wt 101 kg (222 lb)   BMI 30.11 kg/m²     Physical Exam  Constitutional:       Appearance: Normal appearance.   HENT:      Head: Normocephalic and atraumatic.   Pulmonary:      Effort: Pulmonary effort is normal.   Skin:     Coloration: Skin is not pale.   Neurological:      Mental Status: He is alert.   Psychiatric:         Mood and Affect: Mood normal.         Behavior: Behavior normal.             Results for orders placed or performed in visit on 10/16/24   POC Urinalysis Dipstick, Multipro    Collection Time: 10/16/24  8:11 AM    Specimen: Urine   Result Value Ref Range    Color Yellow Yellow, Straw, Dark Yellow, Nhung    Clarity, UA Clear Clear    Glucose, UA Negative Negative mg/dL    Bilirubin Negative Negative    Ketones, UA Negative Negative    Specific Gravity  1.030 1.005 - 1.030    Blood, UA Negative Negative    pH, Urine 6.0 5.0 - 8.0    Protein, POC Negative Negative mg/dL    Urobilinogen, UA Normal Normal, 0.2 E.U./dL    Nitrite, UA Negative Negative    Leukocytes Negative Negative     Assessment and Plan    Diagnoses and all orders for this visit:    1. Erectile dysfunction due to arterial insufficiency (Primary)  -     POC Urinalysis Dipstick, Multipro    2. Penile abnormality      Regarding his erectile dysfunction after discussion he wants to go ahead and order "[employer] vacuum erectile device.  I completed the paperwork and patient will mail with his payment information to them.  " He opted for this [employer] OTC premium.    Regarding the penile abnormality in curvature the MRI shows no actual or definite penile fracture or injury to the tunica albuginea there is some thickening of the tunica albuginea at the area where he feels there is indentation somewhat of an hourglass shape and there is a slight penile curvature.    I did offer a referral to [facility] to Dr. Bee he is not interested in penile prosthesis.    Up in 3 months.

## 2024-10-02 ENCOUNTER — HOSPITAL ENCOUNTER (OUTPATIENT)
Dept: MRI IMAGING | Facility: HOSPITAL | Age: 69
Discharge: HOME | End: 2024-10-02
Admitting: PHYSICIAN ASSISTANT
Payer: MEDICARE

## 2024-10-02 DIAGNOSIS — N48.9 PENILE ABNORMALITY: ICD-10-CM

## 2024-10-02 DIAGNOSIS — N48.89 PENILE CURVATURE, ACQUIRED: ICD-10-CM

## 2024-10-02 PROCEDURE — 72195 MRI PELVIS W/O DYE: CPT

## 2024-10-16 ENCOUNTER — OFFICE VISIT (OUTPATIENT)
Dept: UROLOGY | Facility: CLINIC | Age: 69
End: 2024-10-16
Payer: MEDICARE

## 2024-10-16 VITALS — HEIGHT: 72 IN | TEMPERATURE: 97.8 F | WEIGHT: 222 LBS | BODY MASS INDEX: 30.07 KG/M2

## 2024-10-16 DIAGNOSIS — N48.9 PENILE ABNORMALITY: ICD-10-CM

## 2024-10-16 DIAGNOSIS — N52.01 ERECTILE DYSFUNCTION DUE TO ARTERIAL INSUFFICIENCY: Primary | ICD-10-CM

## 2025-01-08 NOTE — PROGRESS NOTES
Subjective    Mr. Pereira is 69 y.o. male    Chief Complaint: Erectile dysfunction    History of Present Illness  Patient presents for follow-up on erectile dysfunction.  He also has had slight penile curvature and abnormal penis.  Described as indentations.  Patient purchased a vacuum erect a device from [employer] and has had excellent response patient states the length and girth of the penis are increased and it does allow him to have a full erection there are times the indentations also are not evident when he has a full erection.    The following portions of the patient's history were reviewed and updated as appropriate: allergies, current medications, past family history, past medical history, past social history, past surgical history and problem list.    Review of Systems   Genitourinary: Negative.          Current Outpatient Medications:     Budesonide-Formoterol Fumarate (SYMBICORT IN), 2 puffs As Needed., Disp: , Rfl:     DULoxetine (CYMBALTA) 30 MG capsule, 1 capsule Daily., Disp: , Rfl:     ezetimibe (ZETIA) 10 MG tablet, Take 1 tablet by mouth every night at bedtime., Disp: , Rfl:     fenofibrate (TRICOR) 145 MG tablet, Take 1 tablet by mouth Every Evening., Disp: , Rfl:     gabapentin (NEURONTIN) 100 MG capsule, Take 1 capsule by mouth every night at bedtime., Disp: , Rfl:     HYDROcodone-acetaminophen (NORCO) 7.5-325 MG per tablet, Take 1 tablet by mouth Every 6 (Six) Hours As Needed for Moderate Pain. (Patient not taking: Reported on 10/16/2024), Disp: , Rfl:     Lantus SoloStar 100 UNIT/ML injection pen, , Disp: , Rfl:     losartan-hydrochlorothiazide (HYZAAR) 100-25 MG per tablet, Take 1 tablet by mouth Daily., Disp: , Rfl:     metFORMIN (GLUCOPHAGE) 500 MG tablet, Take 1 tablet by mouth., Disp: , Rfl:     metoprolol tartrate (LOPRESSOR) 25 MG tablet, Take 1 tablet by mouth 2 (Two) Times a Day., Disp: 60 tablet, Rfl: 11    montelukast (SINGULAIR) 10 MG tablet, Take 1 tablet by mouth every night at  bedtime., Disp: , Rfl:     rOPINIRole (REQUIP) 0.5 MG tablet, Take 1 tablet by mouth Daily., Disp: , Rfl:     tadalafil (CIALIS) 20 MG tablet, Take As Directed., Disp: , Rfl:     tamsulosin (FLOMAX) 0.4 MG capsule 24 hr capsule, Take 1 capsule by mouth Daily., Disp: , Rfl:     triamcinolone acetonide (Kenalog-40) 40 MG/ML injection, Inject 1 mL into the appropriate muscle as directed by prescriber Every 3 (Three) Months., Disp: , Rfl:     zolpidem (AMBIEN) 10 MG tablet, Take 1 tablet by mouth At Night As Needed for Sleep., Disp: , Rfl:     Past Medical History:   Diagnosis Date    Arthritis     Diabetes mellitus     Erectile dysfunction     History of squamous cell carcinoma     Hyperlipidemia     Hypertension     Restless legs syndrome (RLS)        Past Surgical History:   Procedure Laterality Date    ACHILLES TENDON REPAIR      MANDIBLE SURGERY      SQUAMOUS CELL CARCINOMA EXCISION      From face    VASECTOMY         Social History     Socioeconomic History    Marital status:    Tobacco Use    Smoking status: Never     Passive exposure: Never    Smokeless tobacco: Never   Vaping Use    Vaping status: Never Used   Substance and Sexual Activity    Alcohol use: Yes     Alcohol/week: 4.0 standard drinks of alcohol     Types: 2 Glasses of wine, 2 Cans of beer per week     Comment: Occ    Drug use: Never    Sexual activity: Not Currently     Partners: Female     Birth control/protection: Vasectomy       Family History   Problem Relation Age of Onset    Cancer Mother     Heart disease Mother     Hypertension Mother     Colon polyps Neg Hx     Colon cancer Neg Hx     Esophageal cancer Neg Hx     Liver cancer Neg Hx     Stomach cancer Neg Hx     Rectal cancer Neg Hx     Liver disease Neg Hx        Objective    There were no vitals taken for this visit.    Physical Exam  Constitutional:       Appearance: Normal appearance.   HENT:      Head: Normocephalic and atraumatic.   Pulmonary:      Effort: Pulmonary effort is  normal.   Neurological:      Mental Status: He is alert.   Psychiatric:         Mood and Affect: Mood normal.         Behavior: Behavior normal.             Results for orders placed or performed in visit on 10/16/24   POC Urinalysis Dipstick, Multipro    Collection Time: 10/16/24  8:11 AM    Specimen: Urine   Result Value Ref Range    Color Yellow Yellow, Straw, Dark Yellow, Nhung    Clarity, UA Clear Clear    Glucose, UA Negative Negative mg/dL    Bilirubin Negative Negative    Ketones, UA Negative Negative    Specific Gravity  1.030 1.005 - 1.030    Blood, UA Negative Negative    pH, Urine 6.0 5.0 - 8.0    Protein, POC Negative Negative mg/dL    Urobilinogen, UA Normal Normal, 0.2 E.U./dL    Nitrite, UA Negative Negative    Leukocytes Negative Negative     Assessment and Plan    Diagnoses and all orders for this visit:    1. Erectile dysfunction due to arterial insufficiency (Primary)  -     POC Urinalysis Dipstick, Multipro      He has had an excellent response to the [employer] vacuum erectile device.  He is able to attain full erection for intercourse also has noticed that this appeared to increased girth and size of his penis.    He will follow-up in 3 months.

## 2025-01-16 ENCOUNTER — OFFICE VISIT (OUTPATIENT)
Dept: UROLOGY | Facility: CLINIC | Age: 70
End: 2025-01-16
Payer: MEDICARE

## 2025-01-16 VITALS — TEMPERATURE: 97.9 F | HEIGHT: 72 IN | BODY MASS INDEX: 30.61 KG/M2 | WEIGHT: 226 LBS

## 2025-01-16 DIAGNOSIS — N52.01 ERECTILE DYSFUNCTION DUE TO ARTERIAL INSUFFICIENCY: Primary | ICD-10-CM

## 2025-01-16 LAB
BILIRUB BLD-MCNC: NEGATIVE MG/DL
CLARITY, POC: CLEAR
COLOR UR: YELLOW
GLUCOSE UR STRIP-MCNC: ABNORMAL MG/DL
KETONES UR QL: NEGATIVE
LEUKOCYTE EST, POC: NEGATIVE
NITRITE UR-MCNC: NEGATIVE MG/ML
PH UR: 6.5 [PH] (ref 5–8)
PROT UR STRIP-MCNC: NEGATIVE MG/DL
RBC # UR STRIP: NEGATIVE /UL
SP GR UR: 1.02 (ref 1–1.03)
UROBILINOGEN UR QL: NORMAL

## 2025-01-16 RX ORDER — ERGOCALCIFEROL 1.25 MG/1
1 CAPSULE, LIQUID FILLED ORAL WEEKLY
COMMUNITY
Start: 2024-10-22

## 2025-01-16 RX ORDER — GABAPENTIN 400 MG/1
400 CAPSULE ORAL 2 TIMES DAILY
COMMUNITY

## 2025-01-28 ENCOUNTER — HOSPITAL ENCOUNTER (OUTPATIENT)
Dept: CT IMAGING | Facility: HOSPITAL | Age: 70
Discharge: HOME | End: 2025-01-28
Admitting: NURSE PRACTITIONER
Payer: MEDICARE

## 2025-01-28 DIAGNOSIS — I71.21 ANEURYSM OF ASCENDING AORTA WITHOUT RUPTURE: ICD-10-CM

## 2025-01-28 DIAGNOSIS — Q25.43 AORTIC ROOT ANEURYSM: ICD-10-CM

## 2025-01-28 LAB — CREAT BLDA-MCNC: 1.1 MG/DL (ref 0.6–1.3)

## 2025-01-28 PROCEDURE — 25510000001 IOPAMIDOL PER 1 ML: Performed by: NURSE PRACTITIONER

## 2025-01-28 PROCEDURE — 71275 CT ANGIOGRAPHY CHEST: CPT

## 2025-01-28 PROCEDURE — 82565 ASSAY OF CREATININE: CPT

## 2025-01-28 RX ORDER — IOPAMIDOL 755 MG/ML
100 INJECTION, SOLUTION INTRAVASCULAR
Status: COMPLETED | OUTPATIENT
Start: 2025-01-28 | End: 2025-01-28

## 2025-01-28 RX ADMIN — IOPAMIDOL 100 ML: 755 INJECTION, SOLUTION INTRAVENOUS at 10:18

## 2025-01-29 ENCOUNTER — OFFICE VISIT (OUTPATIENT)
Dept: CARDIAC SURGERY | Facility: CLINIC | Age: 70
End: 2025-01-29
Payer: MEDICARE

## 2025-01-29 VITALS
OXYGEN SATURATION: 93 % | SYSTOLIC BLOOD PRESSURE: 126 MMHG | DIASTOLIC BLOOD PRESSURE: 88 MMHG | HEART RATE: 87 BPM | BODY MASS INDEX: 30.48 KG/M2 | HEIGHT: 72 IN | WEIGHT: 225 LBS

## 2025-01-29 DIAGNOSIS — I71.21 ANEURYSM OF ASCENDING AORTA WITHOUT RUPTURE: Primary | ICD-10-CM

## 2025-01-29 DIAGNOSIS — Q25.43 AORTIC ROOT ANEURYSM: ICD-10-CM

## 2025-01-29 DIAGNOSIS — I35.1 NONRHEUMATIC AORTIC VALVE INSUFFICIENCY: ICD-10-CM

## 2025-01-29 PROCEDURE — 1159F MED LIST DOCD IN RCRD: CPT | Performed by: THORACIC SURGERY (CARDIOTHORACIC VASCULAR SURGERY)

## 2025-01-29 PROCEDURE — 3079F DIAST BP 80-89 MM HG: CPT | Performed by: THORACIC SURGERY (CARDIOTHORACIC VASCULAR SURGERY)

## 2025-01-29 PROCEDURE — 99213 OFFICE O/P EST LOW 20 MIN: CPT | Performed by: THORACIC SURGERY (CARDIOTHORACIC VASCULAR SURGERY)

## 2025-01-29 PROCEDURE — 3074F SYST BP LT 130 MM HG: CPT | Performed by: THORACIC SURGERY (CARDIOTHORACIC VASCULAR SURGERY)

## 2025-01-29 PROCEDURE — 1160F RVW MEDS BY RX/DR IN RCRD: CPT | Performed by: THORACIC SURGERY (CARDIOTHORACIC VASCULAR SURGERY)

## 2025-01-29 RX ORDER — INSULIN LISPRO 100 [IU]/ML
INJECTION, SOLUTION INTRAVENOUS; SUBCUTANEOUS
COMMUNITY
Start: 2025-01-27

## 2025-01-29 RX ORDER — CLONIDINE HYDROCHLORIDE 0.1 MG/1
0.1 TABLET ORAL DAILY PRN
COMMUNITY
Start: 2025-01-17

## 2025-02-03 NOTE — PROGRESS NOTES
Subjective   Patient ID: John Pereira is a 69 y.o. male who is here for follow-up of a known aneurysm.   Chief Complaint   Patient presents with    Aneurysm of Ascending Aorta     Pt is here for 1 year follow up w/ CT     History of Present Illness  The patient presents for evaluation of an aortic root aneurysm and an ascending aortic aneurysm.    He reports experiencing fatigue and sleepiness, attributing it to the prolonged waiting period prior to the consultation. He has been adhering to his prescribed aortic restrictions and has abstained from heavy weightlifting. He has not experienced any significant chest pain, although he acknowledges occasional discomfort, which he attributes to gas. This discomfort typically resolves upon raising his arms.    He expresses a desire to avoid surgical intervention for his aneurysm in the current year, with a preference to postpone until January 2026, if possible.     He underwent a knee replacement procedure approximately one year ago, which has since improved his condition. He resumed gym activities 2 to 3 weeks ago, during which he experienced a general sense of well-being. However, he discontinued these activities due to the onset of cold weather. He plans to resume his gym routine and focus on weight loss to improve his overall health status. His exercise regimen includes 20 minutes of treadmill use for cardiovascular health.    SOCIAL HISTORY  He is a non-smoker.    FAMILY HISTORY  His mother had open heart surgery about 6 years ago and currently has heart problems.    The following portions of the patient's history were reviewed and updated as appropriate: allergies, current medications, past family history, past medical history, past social history, past surgical history and problem list.       Objective   Physical Exam  Physical Exam  The patient is in no acute distress, appears appropriate, and is alert.  Lungs are clear to auscultation bilaterally without  wheezing, rubs, or rales.  Heart has a regular rate and rhythm without murmurs, rubs, or gallops.  Abdomen is soft, nondistended, and nontender.  There is no clubbing, cyanosis, or edema in the extremities.      CT Angiogram Chest    Result Date: 1/28/2025  Narrative: EXAMINATION: CT ANGIOGRAM CHEST-   1/28/2025 9:07 AM  HISTORY: Aortic root and ascending aortic aneurysm; I71.21-Aneurysm of the ascending aorta, without rupture; Q25.43-Congenital aneurysm of aorta  In order to have a CT radiation dose as low as reasonably achievable Automated Exposure Control was utilized for adjustment of the mA and/or KV according to patient size.  Total DLP = 468.54 mGy.cm  The CT angiography of the chest should performed after intravenous contrast enhancement.  Images are acquired in the axial plane with subsequent 2D reconstruction in coronal and sagittal planes and 3D maximum intensity projection image reconstruction.  The comparison is made with the previous study dated 7/17/2024.  Atheromatous changes of the thoracic aorta are noted. There is moderate ectasia of the ascending aorta which measures 4.2 cm in the mid ascending level. The measurements is made in axial plane image #64 in series 4. Moderately ectatic aortic root is seen which is measured in sagittal plane images, image 98 in series 8 and measures 4.6 cm. No significant change.  No dissection.  Limited visualized coronary arteries are unremarkable without a significant calcific plaquing.  Normal opacification of pulmonary arteries and branches bilaterally. No filling defects in the opacified pulmonary arterial bed.  No finding to suggest right heart strain.  No evidence of mediastinal or hilar mass or lymphadenopathy.  Moderate cardiomegaly.  Limited visualized soft tissues of the neck are unremarkable. Asymmetrical fullness of the right lobe of the thyroid gland is noted. No nodule.  No axillary lymphadenopathy.  The lungs are moderately well-expanded. A tiny  subpleural nodule in the right lower lobe superior segment, image #74 in series 6 measures 3 mm in diameter. Another tiny nodule located in the left lower lobe posteromedially, image #83 and series 6, measuring 3 mm. No change. No additional nodules.  The Central airway is patent and clear.  Mild to moderate dependent atelectatic change in the bilateral bases.  Limited included abdomen is unremarkable except for moderate splenomegaly. A small exophytic nodule from the right kidney is suboptimally visualized and evaluated. There is a probable hepatic steatosis. The gallbladder is surgically absent. Limited visualized pancreas is unremarkable.      Impression: 1. Stable and unchanged CT angiography of the chest. Moderate ectasia of the ascending aorta is similar to the previous study. No dissection. 2. No evidence of thromboembolic disease of the pulmonary arteries. 3. Stable tiny nodules in both lungs. No change over 2 years.                This report was signed and finalized on 1/28/2025 10:56 AM by Dr. Hortencia Correa MD.     Results  Imaging  CTA chest obtained on 01/20/2024, the distal ascending artery measured 3.8 cm in size, the ascending aorta in greatest dimension measures 4.2 cm in size and the aortic root on actual imaging measures 4.7 cm in size. Coronal reconstruction measures the root to measure 4.6 in size. Previously, the ascending artery measured 4.2 cm in size and the aortic root measured 4.9. Sagittal reconstruction measures 4.7 size.    Diagnoses and all orders for this visit:    1. Aneurysm of ascending aorta without rupture (Primary)  -     CT angiogram chest w contrast; Future    2. Aortic root aneurysm  -     CT angiogram chest w contrast; Future    3. Nonrheumatic aortic valve insufficiency mild  -     CT angiogram chest w contrast; Future          Assessment & Plan     Assessment & Plan  1. Aortic root aneurysm.  The patient's condition necessitates a conservative approach with continued  surveillance at a planned interval of 6 months. The importance of adhering to aortic restrictions was emphasized, and he has demonstrated compliance, even reducing his exercise regimen due to various factors. He is a non-smoker. He is encouraged to resume physical activity while being mindful of his aortic restrictions. The signs and symptoms of acute aortic syndromes were discussed, and all queries were addressed to the best of our ability. A repeat imaging with a CTA chest is scheduled in 6 months.    2. Ascending aortic aneurysm.  The patient's condition necessitates a conservative approach with continued surveillance at a planned interval of 6 months. The importance of adhering to aortic restrictions was emphasized, and he has demonstrated compliance, even reducing his exercise regimen due to various factors. He is a non-smoker. He is encouraged to resume physical activity while being mindful of his aortic restrictions. The signs and symptoms of acute aortic syndromes were discussed, and all queries were addressed to the best of our ability. A repeat imaging with a CTA chest is scheduled in 6 months.    3. Hypertension.  His blood pressure is not optimally controlled. He is advised to consult with [facility] on 30th to discuss potential adjustments to his medication regimen. Weight loss and regular exercise are recommended to help manage his blood pressure.    4. Hypercholesterolemia.  His cholesterol levels are elevated. Weight loss and regular exercise are recommended to help manage his cholesterol levels. Dietary modifications, including reducing intake of saturated fats and cholesterol, are also advised.    5. Obesity.  He is advised to lose weight to help manage his hypertension and hypercholesterolemia. Regular exercise and dietary modifications are recommended. He is encouraged to resume his gym routine and consider cardiovascular exercises such as treadmill workouts.    Follow-up  The patient will follow up  in 6 months.    He is a non smoker.      Many thanks for the opportunity to care for your patient.    I will continue to keep you apprised of provided care as it ensues.             Patient or patient representative verbalized consent for the use of Ambient Listening during the visit with  Ashwin Cheng MD for chart documentation. 2/2/2025  18:04 CST

## 2025-04-03 NOTE — PROGRESS NOTES
Subjective    Mr. Pereira is 69 y.o. male    Chief Complaint: Erectile Dysfunction    History of Present Illness  Patient with erectile dysfunction presents for 3-month follow-up.  He is currently using the  vacuum erect a device for his erectile dysfunction he also has history of penile curvature and indentations in the penis shaft.  This has resolved mildly after using the vacuum erectile device.  States he does not get a chance to use it is much but it does get him an erection.  He had tried oral medications which he was able to attain an erection but felt the firmness and longevity was not adequate.  Not tried penile injections.  He was asking and is interested in the combination Viagra Cialis lost interest that is available.  Also would like to discuss or get information on the penile prosthesis.    The following portions of the patient's history were reviewed and updated as appropriate: allergies, current medications, past family history, past medical history, past social history, past surgical history and problem list.    Review of Systems   Genitourinary:         Erectile dysfunction         Current Outpatient Medications:     cloNIDine (CATAPRES) 0.1 MG tablet, Take 1 tablet by mouth Daily As Needed for High Blood Pressure., Disp: , Rfl:     Cyanocobalamin (VITAMIN B-12 SL), Place  under the tongue., Disp: , Rfl:     DULoxetine (CYMBALTA) 30 MG capsule, 1 capsule Daily., Disp: , Rfl:     ezetimibe (ZETIA) 10 MG tablet, Take 1 tablet by mouth every night at bedtime., Disp: , Rfl:     fenofibrate (TRICOR) 145 MG tablet, Take 1 tablet by mouth Every Evening., Disp: , Rfl:     gabapentin (NEURONTIN) 400 MG capsule, Take 1 capsule by mouth 2 (Two) Times a Day., Disp: , Rfl:     HYDROcodone-acetaminophen (NORCO) 7.5-325 MG per tablet, Take 1 tablet by mouth Every 6 (Six) Hours As Needed for Moderate Pain., Disp: , Rfl:     Insulin Lispro, 1 Unit Dial, (HUMALOG) 100 UNIT/ML solution pen-injector, , Disp: ,  Rfl:     Lantus SoloStar 100 UNIT/ML injection pen, , Disp: , Rfl:     losartan-hydrochlorothiazide (HYZAAR) 100-25 MG per tablet, Take 1 tablet by mouth Daily., Disp: , Rfl:     metFORMIN (GLUCOPHAGE) 500 MG tablet, Take 1 tablet by mouth., Disp: , Rfl:     metoprolol tartrate (LOPRESSOR) 25 MG tablet, Take 1 tablet by mouth 2 (Two) Times a Day., Disp: 60 tablet, Rfl: 11    montelukast (SINGULAIR) 10 MG tablet, Take 1 tablet by mouth every night at bedtime., Disp: , Rfl:     rOPINIRole (REQUIP) 0.5 MG tablet, Take 1 tablet by mouth Daily., Disp: , Rfl:     tadalafil (CIALIS) 20 MG tablet, Take As Directed., Disp: , Rfl:     tamsulosin (FLOMAX) 0.4 MG capsule 24 hr capsule, Take 1 capsule by mouth Daily., Disp: , Rfl:     triamcinolone acetonide (Kenalog-40) 40 MG/ML injection, Inject 1 mL into the appropriate muscle as directed by prescriber Every 3 (Three) Months., Disp: , Rfl:     vitamin D (ERGOCALCIFEROL) 1.25 MG (54559 UT) capsule capsule, Take 1 capsule by mouth 1 (One) Time Per Week., Disp: , Rfl:     zolpidem (AMBIEN) 10 MG tablet, Take 1 tablet by mouth At Night As Needed for Sleep., Disp: , Rfl:     Past Medical History:   Diagnosis Date    Arthritis     Diabetes mellitus     Erectile dysfunction     History of squamous cell carcinoma     Hyperlipidemia     Hypertension     Restless legs syndrome (RLS)        Past Surgical History:   Procedure Laterality Date    ACHILLES TENDON REPAIR      MANDIBLE SURGERY      SQUAMOUS CELL CARCINOMA EXCISION      From face    VASECTOMY         Social History     Socioeconomic History    Marital status:    Tobacco Use    Smoking status: Never     Passive exposure: Never    Smokeless tobacco: Never   Vaping Use    Vaping status: Never Used   Substance and Sexual Activity    Alcohol use: Yes     Alcohol/week: 4.0 standard drinks of alcohol     Types: 2 Glasses of wine, 2 Cans of beer per week     Comment: Occ    Drug use: Never    Sexual activity: Not Currently      "Partners: Female     Birth control/protection: Vasectomy       Family History   Problem Relation Age of Onset    Cancer Mother     Heart disease Mother     Hypertension Mother     Heart attack Mother     Colon polyps Neg Hx     Colon cancer Neg Hx     Esophageal cancer Neg Hx     Liver cancer Neg Hx     Stomach cancer Neg Hx     Rectal cancer Neg Hx     Liver disease Neg Hx        Objective    Temp 97.4 °F (36.3 °C)   Ht 182.9 cm (72\")   Wt 102 kg (225 lb)   BMI 30.52 kg/m²     Physical Exam  Constitutional:       Appearance: Normal appearance.   HENT:      Head: Normocephalic and atraumatic.   Pulmonary:      Effort: Pulmonary effort is normal.   Neurological:      Mental Status: He is alert.   Psychiatric:         Mood and Affect: Mood normal.         Behavior: Behavior normal.             Results for orders placed or performed in visit on 04/17/25   POC Urinalysis Dipstick, Multipro    Collection Time: 04/17/25 10:27 AM    Specimen: Urine   Result Value Ref Range    Color Yellow Yellow, Straw, Dark Yellow, Nhung    Clarity, UA Clear Clear    Glucose, UA Negative Negative mg/dL    Bilirubin Negative Negative    Ketones, UA Negative Negative    Specific Gravity  1.025 1.005 - 1.030    Blood, UA Trace (A) Negative    pH, Urine 6.0 5.0 - 8.0    Protein, POC Negative Negative mg/dL    Urobilinogen, UA 1 E.U./dL (A) Normal, 0.2 E.U./dL    Nitrite, UA Negative Negative    Leukocytes Negative Negative     Assessment and Plan    Diagnoses and all orders for this visit:    1. Erectile dysfunction due to arterial insufficiency (Primary)  -     POC Urinalysis Dipstick, Multipro    2. Penile curvature, acquired      Improvement in symptoms since success with the [place] vacuum rectae device.  He feels that may be not working quite as well as it once did.  He has tried oral medications in the past and they would just were not effective enough.    Has not tried penile injections he is interested in the combination Cialis " Viagra I sent prescription to [facility] pharmacy for 5 if he would like more he can contact us.    Also he is interested in the penile prosthesis but would not pursue this until the winter so I will see him back in 6 months.  If you are wishes to proceed with discussing penile prosthesis in more detail at his next follow-up I could then arrange a an appointment with Dr. Julio at that time.

## 2025-04-17 ENCOUNTER — OFFICE VISIT (OUTPATIENT)
Dept: UROLOGY | Facility: CLINIC | Age: 70
End: 2025-04-17
Payer: MEDICARE

## 2025-04-17 VITALS — HEIGHT: 72 IN | WEIGHT: 225 LBS | TEMPERATURE: 97.4 F | BODY MASS INDEX: 30.48 KG/M2

## 2025-04-17 DIAGNOSIS — N48.89 PENILE CURVATURE, ACQUIRED: ICD-10-CM

## 2025-04-17 DIAGNOSIS — N52.01 ERECTILE DYSFUNCTION DUE TO ARTERIAL INSUFFICIENCY: Primary | ICD-10-CM

## 2025-04-17 LAB
BILIRUB BLD-MCNC: NEGATIVE MG/DL
CLARITY, POC: CLEAR
COLOR UR: YELLOW
GLUCOSE UR STRIP-MCNC: NEGATIVE MG/DL
KETONES UR QL: NEGATIVE
LEUKOCYTE EST, POC: NEGATIVE
NITRITE UR-MCNC: NEGATIVE MG/ML
PH UR: 6 [PH] (ref 5–8)
PROT UR STRIP-MCNC: NEGATIVE MG/DL
RBC # UR STRIP: ABNORMAL /UL
SP GR UR: 1.02 (ref 1–1.03)
UROBILINOGEN UR QL: ABNORMAL

## 2025-06-17 ENCOUNTER — OFFICE VISIT (OUTPATIENT)
Dept: CARDIOLOGY | Facility: CLINIC | Age: 70
End: 2025-06-17
Payer: MEDICARE

## 2025-06-17 VITALS
HEART RATE: 92 BPM | RESPIRATION RATE: 18 BRPM | DIASTOLIC BLOOD PRESSURE: 82 MMHG | HEIGHT: 72 IN | OXYGEN SATURATION: 96 % | SYSTOLIC BLOOD PRESSURE: 138 MMHG | WEIGHT: 228 LBS | BODY MASS INDEX: 30.88 KG/M2

## 2025-06-17 DIAGNOSIS — R00.2 PALPITATIONS: Primary | ICD-10-CM

## 2025-06-17 DIAGNOSIS — I10 PRIMARY HYPERTENSION: ICD-10-CM

## 2025-06-17 DIAGNOSIS — G47.30 SLEEP APNEA, UNSPECIFIED TYPE: ICD-10-CM

## 2025-06-17 DIAGNOSIS — I71.21 ANEURYSM OF ASCENDING AORTA WITHOUT RUPTURE: ICD-10-CM

## 2025-06-17 PROCEDURE — 1159F MED LIST DOCD IN RCRD: CPT | Performed by: NURSE PRACTITIONER

## 2025-06-17 PROCEDURE — 99214 OFFICE O/P EST MOD 30 MIN: CPT | Performed by: NURSE PRACTITIONER

## 2025-06-17 PROCEDURE — 1160F RVW MEDS BY RX/DR IN RCRD: CPT | Performed by: NURSE PRACTITIONER

## 2025-06-17 PROCEDURE — 93000 ELECTROCARDIOGRAM COMPLETE: CPT | Performed by: NURSE PRACTITIONER

## 2025-06-17 PROCEDURE — 3079F DIAST BP 80-89 MM HG: CPT | Performed by: NURSE PRACTITIONER

## 2025-06-17 PROCEDURE — 3075F SYST BP GE 130 - 139MM HG: CPT | Performed by: NURSE PRACTITIONER

## 2025-06-17 NOTE — PROGRESS NOTES
Subjective:     Encounter Date:06/17/2025      Patient ID: John Pereira is a 70 y.o. male     Chief Complaint:  Palpitations   The problem has been resolved. Pertinent negatives include no chest pain, coughing, dizziness, fever, malaise/fatigue, nausea, near-syncope, shortness of breath, syncope or vomiting.     Patient presents today for routine cardiology follow up. Overall patient is stable. He denies palpitations. Notes he has been feeling well. He had CT of his Aorta in January which was stable. Denies chest pain. Notes he attempted to use a CPAP machine but could not tolerate. Patient is followed for palpitations, hypertension and paroxysmal atrial fibrillation. Patient also has ascending aorta aneurysm and aortic root aneurysm- for which he was referred to Dr. Cheng and is now followed  by [facility]. He also has type II diabetes, hyperlipidemia, erectile dysfunction and sleep apnea.   He follows with Fabby CHAN as his PCP.     The following portions of the patient's history were reviewed and updated as appropriate: allergies, current medications, past medical history, past social history, past and problem list.    Allergies   Allergen Reactions    Ciprofloxacin Other (See Comments)     Caused achilles tendonitis       Current Outpatient Medications:     cloNIDine (CATAPRES) 0.1 MG tablet, Take 1 tablet by mouth Daily As Needed for High Blood Pressure., Disp: , Rfl:     Cyanocobalamin (VITAMIN B-12 SL), Place  under the tongue., Disp: , Rfl:     DULoxetine (CYMBALTA) 30 MG capsule, 1 capsule Daily., Disp: , Rfl:     ezetimibe (ZETIA) 10 MG tablet, Take 1 tablet by mouth every night at bedtime., Disp: , Rfl:     fenofibrate (TRICOR) 145 MG tablet, Take 1 tablet by mouth Every Evening., Disp: , Rfl:     gabapentin (NEURONTIN) 400 MG capsule, Take 1 capsule by mouth 2 (Two) Times a Day., Disp: , Rfl:     HYDROcodone-acetaminophen (NORCO) 7.5-325 MG per tablet, Take 1 tablet by mouth Every 6 (Six) Hours As  Needed for Moderate Pain., Disp: , Rfl:     Insulin Lispro, 1 Unit Dial, (HUMALOG) 100 UNIT/ML solution pen-injector, , Disp: , Rfl:     Lantus SoloStar 100 UNIT/ML injection pen, , Disp: , Rfl:     losartan-hydrochlorothiazide (HYZAAR) 100-25 MG per tablet, Take 1 tablet by mouth Daily., Disp: , Rfl:     metFORMIN (GLUCOPHAGE) 500 MG tablet, Take 1 tablet by mouth., Disp: , Rfl:     metoprolol tartrate (LOPRESSOR) 25 MG tablet, Take 1 tablet by mouth 2 (Two) Times a Day., Disp: 60 tablet, Rfl: 11    montelukast (SINGULAIR) 10 MG tablet, Take 1 tablet by mouth every night at bedtime., Disp: , Rfl:     rOPINIRole (REQUIP) 0.5 MG tablet, Take 1 tablet by mouth Daily., Disp: , Rfl:     tadalafil (CIALIS) 20 MG tablet, Take As Directed., Disp: , Rfl:     tamsulosin (FLOMAX) 0.4 MG capsule 24 hr capsule, Take 1 capsule by mouth Daily., Disp: , Rfl:     triamcinolone acetonide (Kenalog-40) 40 MG/ML injection, Inject 1 mL into the appropriate muscle as directed by prescriber Every 3 (Three) Months., Disp: , Rfl:     vitamin D (ERGOCALCIFEROL) 1.25 MG (20393 UT) capsule capsule, Take 1 capsule by mouth 1 (One) Time Per Week., Disp: , Rfl:     zolpidem (AMBIEN) 10 MG tablet, Take 1 tablet by mouth At Night As Needed for Sleep., Disp: , Rfl:     Social History     Socioeconomic History    Marital status:    Tobacco Use    Smoking status: Never     Passive exposure: Never    Smokeless tobacco: Never   Vaping Use    Vaping status: Never Used   Substance and Sexual Activity    Alcohol use: Yes     Alcohol/week: 4.0 standard drinks of alcohol     Types: 2 Glasses of wine, 2 Cans of beer per week     Comment: Occ    Drug use: Never    Sexual activity: Not Currently     Partners: Female     Birth control/protection: Vasectomy       Review of Systems   Constitutional: Negative for chills, decreased appetite, fever, malaise/fatigue, weight gain and weight loss.   HENT:  Negative for nosebleeds.    Eyes:  Negative for visual  disturbance.   Cardiovascular:  Negative for chest pain, dyspnea on exertion, leg swelling, near-syncope, orthopnea, palpitations, paroxysmal nocturnal dyspnea and syncope.   Respiratory:  Negative for cough, hemoptysis, shortness of breath and snoring.    Endocrine: Negative for cold intolerance and heat intolerance.   Hematologic/Lymphatic: Negative for bleeding problem. Does not bruise/bleed easily.   Skin:  Negative for rash.   Musculoskeletal:  Negative for back pain and falls.   Gastrointestinal:  Negative for abdominal pain, constipation, diarrhea, heartburn, melena, nausea and vomiting.   Genitourinary:  Negative for hematuria.   Neurological:  Negative for dizziness, headaches and light-headedness.   Psychiatric/Behavioral:  Negative for altered mental status.    Allergic/Immunologic: Negative for persistent infections.              Objective:     Constitutional:       Appearance: Healthy appearance. Well-developed and not in distress.   Eyes:      Pupils: Pupils are equal, round, and reactive to light.   HENT:      Head: Normocephalic and atraumatic.   Neck:      Vascular: No carotid bruit or JVD.   Pulmonary:      Effort: Pulmonary effort is normal.      Breath sounds: Normal breath sounds.   Cardiovascular:      Normal rate. Regular rhythm.      Murmurs: There is no murmur.   Pulses:     Intact distal pulses.   Edema:     Peripheral edema absent.   Abdominal:      General: Bowel sounds are normal.      Palpations: Abdomen is soft.   Musculoskeletal: Normal range of motion.      Cervical back: Normal range of motion and neck supple. Skin:     General: Skin is warm and dry.   Neurological:      Mental Status: Alert and oriented to person, place, and time.      Deep Tendon Reflexes: Reflexes are normal and symmetric.   Psychiatric:         Behavior: Behavior normal.         Thought Content: Thought content normal.         Judgment: Judgment normal.             ECG 12 Lead    Date/Time: 6/17/2025 10:52  "AM  Performed by: Bc Morgan APRN    Authorized by: Bc Morgan APRN  Comparison: compared with previous ECG from 10/20/2023  Similar to previous ECG  Rhythm: sinus rhythm    Clinical impression: normal ECG        /82 (BP Location: Left arm, Patient Position: Sitting, Cuff Size: Adult)   Pulse 92   Resp 18   Ht 182.9 cm (72\")   Wt 103 kg (228 lb)   SpO2 96%   BMI 30.92 kg/m²   Lab Review:   I have reviewed   Echo done 5/15/24        No results found for: \"CHOL\", \"CHLPL\", \"TRIG\", \"HDL\", \"LDL\", \"LDLDIRECT\"   Results for orders placed during the hospital encounter of 11/06/23    Adult Stress Echo W/ Cont or Stress Agent if Necessary Per Protocol    Interpretation Summary    Left ventricular ejection fraction appears to be 56 - 60%.    The patient denied chest pain.    Low risk stress test for stress induced myocardial ischemia        All echocardiographic phases visualized which shows increase in contractility wall motion and thickness both globally and regionally suggestive of no obvious evidence of stress-induced ischemia    ____________________________________________________________________  Disclaimer:    Despite low risk stress test for stress induced myocardial ischemia, there is a small but definite fraction of patients who will have significant underlying coronary artery disease that needs further evaluation and definitive treatment.    Missing significant coronary artery disease may result in increased morbidity and mortality.  In view of this if any symptoms such as chest pain, shortness of breath, increasing weakness, feeling dizzy, passing out, palpitations, cold sweats etc.,to seek immediate medical help.    Stress test is intrinsically limited and therefore the results do not confirm or rule out presence of coronary artery disease and need to be interpreted on the basis of presentation and overall clinical " picture.  ______________________________________________________________________     Assessment:          Diagnosis Plan   1. Palpitations        2. Primary hypertension        3. Aneurysm of ascending aorta without rupture        4. Sleep apnea, unspecified type                 Plan:       Palpitations- resolved. Continue Lopressor  Hypertension- blood pressure stable. Well controlled. Monitors at home  Aortic dilation- follows with CTS. CT in January stable.   Sleep Apnea- intolerant to mask. I did discuss briefly INSPIRE as an option.     Follow up in 1 year or sooner if needed.

## 2025-07-16 ENCOUNTER — HOSPITAL ENCOUNTER (OUTPATIENT)
Dept: CT IMAGING | Facility: HOSPITAL | Age: 70
Discharge: HOME | End: 2025-07-16
Admitting: NURSE PRACTITIONER
Payer: MEDICARE

## 2025-07-16 DIAGNOSIS — I71.21 ANEURYSM OF ASCENDING AORTA WITHOUT RUPTURE: ICD-10-CM

## 2025-07-16 DIAGNOSIS — I35.1 NONRHEUMATIC AORTIC VALVE INSUFFICIENCY: ICD-10-CM

## 2025-07-16 DIAGNOSIS — Q25.43 AORTIC ROOT ANEURYSM: ICD-10-CM

## 2025-07-16 LAB — CREAT BLDA-MCNC: 1.1 MG/DL (ref 0.6–1.3)

## 2025-07-16 PROCEDURE — 82565 ASSAY OF CREATININE: CPT

## 2025-07-16 PROCEDURE — 25510000001 IOPAMIDOL PER 1 ML: Performed by: NURSE PRACTITIONER

## 2025-07-16 PROCEDURE — 71275 CT ANGIOGRAPHY CHEST: CPT

## 2025-07-16 RX ORDER — IOPAMIDOL 755 MG/ML
100 INJECTION, SOLUTION INTRAVASCULAR
Status: COMPLETED | OUTPATIENT
Start: 2025-07-16 | End: 2025-07-16

## 2025-07-16 RX ADMIN — IOPAMIDOL 100 ML: 755 INJECTION, SOLUTION INTRAVENOUS at 11:00

## 2025-07-25 ENCOUNTER — TELEPHONE (OUTPATIENT)
Dept: CARDIAC SURGERY | Facility: CLINIC | Age: 70
End: 2025-07-25

## 2025-07-25 NOTE — TELEPHONE ENCOUNTER
Per instructions, attempted to call pt to offer sooner appt with Socorro CHAN for CT Surg follow up.  No answer.  Detailed message left for pt to call back if he would like a sooner appt.  OK to tx call to me or can resched appt for Socorro CHAN at pt convenience./[email or web address]